# Patient Record
Sex: FEMALE | Race: OTHER | Employment: STUDENT | URBAN - METROPOLITAN AREA
[De-identification: names, ages, dates, MRNs, and addresses within clinical notes are randomized per-mention and may not be internally consistent; named-entity substitution may affect disease eponyms.]

---
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
## 2020-02-03 ENCOUNTER — OFFICE VISIT (OUTPATIENT)
Dept: FAMILY MEDICINE CLINIC | Facility: CLINIC | Age: 20
End: 2020-02-03
Payer: COMMERCIAL

## 2020-02-03 VITALS
RESPIRATION RATE: 20 BRPM | HEART RATE: 98 BPM | TEMPERATURE: 98 F | OXYGEN SATURATION: 98 % | HEIGHT: 61.5 IN | WEIGHT: 212 LBS | BODY MASS INDEX: 39.51 KG/M2 | SYSTOLIC BLOOD PRESSURE: 128 MMHG | DIASTOLIC BLOOD PRESSURE: 84 MMHG

## 2020-02-03 DIAGNOSIS — J02.9 SORE THROAT: ICD-10-CM

## 2020-02-03 DIAGNOSIS — J11.1 INFLUENZA-LIKE ILLNESS: Primary | ICD-10-CM

## 2020-02-03 DIAGNOSIS — H60.332 ACUTE SWIMMER'S EAR OF LEFT SIDE: ICD-10-CM

## 2020-02-03 LAB
CONTROL LINE PRESENT WITH A CLEAR BACKGROUND (YES/NO): YES YES/NO
KIT LOT #: NORMAL NUMERIC
STREP GRP A CUL-SCR: NEGATIVE

## 2020-02-03 PROCEDURE — 99213 OFFICE O/P EST LOW 20 MIN: CPT | Performed by: NURSE PRACTITIONER

## 2020-02-03 PROCEDURE — 87880 STREP A ASSAY W/OPTIC: CPT | Performed by: NURSE PRACTITIONER

## 2020-02-03 RX ORDER — OFLOXACIN 3 MG/ML
10 SOLUTION AURICULAR (OTIC) DAILY
Qty: 1 BOTTLE | Refills: 0 | Status: SHIPPED | OUTPATIENT
Start: 2020-02-03 | End: 2020-02-10

## 2020-02-03 NOTE — PROGRESS NOTES
Patient presents with:  Nasal Congestion: s/s for 2 days. OTC meds taken  Ear Pain: left ear  :    HPI:   Mehul Cox is a 23year old female who presents for upper respiratory symptoms for 2.5 days. Started suddenly.   Symptoms have mildly improved sin HEENT: congested; see HPI  CHEST: no chest pains, palpitations. LUNGS: denies shortness of breath or wheezing. CARDIOVASCULAR: denies chest pain. GI: no abdominal pain; see HPI  URO: no decreased urination.       EXAM:   /84   Pulse 98   Temp 97.8 PLAN:      1. Influenza-like illness  Natural course of influenza, possible complications, CDC recommendations, and OTC comfort measures discussed. Sx onset > 48 hrs.     Complications of influenza discussed including secondary infections like AOM, bronchit Influenza is also called the flu. It is a viral illness that affects the air passages of your lungs. It is different from the common cold. The flu can easily be passed from one to person to another.  It may be spread through the air by coughing and sneezing If you are age 72 or older, talk with your provider about getting a pneumococcal vaccine every 5 years. You should also get this vaccine if you have chronic asthma or COPD. All adults should get a flu vaccine every fall. Ask your provider about this.   When

## 2020-02-03 NOTE — PATIENT INSTRUCTIONS
Otitis Externa   · Don't use Q-tips. Keep ear dry. Wear cotton ball in ear during shower. No swimming or head submersion for 7 days and infection cleared. · Use antibiotic drops x 7 days. You should feel better in 2 days.  Use drops x 7 days or infection drinks without caffeine, juices, tea, and soup. Extra fluids will also help loosen secretions in your nose and lungs. · Over-the-counter cold medicines will not make the flu go away faster.  But the medicines may help with coughing, sore throat, and conges

## 2020-02-08 ENCOUNTER — OFFICE VISIT (OUTPATIENT)
Dept: FAMILY MEDICINE CLINIC | Facility: CLINIC | Age: 20
End: 2020-02-08
Payer: COMMERCIAL

## 2020-02-08 VITALS
SYSTOLIC BLOOD PRESSURE: 130 MMHG | BODY MASS INDEX: 39.51 KG/M2 | HEART RATE: 77 BPM | WEIGHT: 212 LBS | DIASTOLIC BLOOD PRESSURE: 80 MMHG | OXYGEN SATURATION: 99 % | RESPIRATION RATE: 20 BRPM | TEMPERATURE: 98 F | HEIGHT: 61.5 IN

## 2020-02-08 DIAGNOSIS — H60.332 ACUTE SWIMMER'S EAR OF LEFT SIDE: ICD-10-CM

## 2020-02-08 DIAGNOSIS — J11.1 INFLUENZA-LIKE ILLNESS: Primary | ICD-10-CM

## 2020-02-08 PROCEDURE — 99213 OFFICE O/P EST LOW 20 MIN: CPT | Performed by: NURSE PRACTITIONER

## 2020-02-08 NOTE — PATIENT INSTRUCTIONS
the ear drops prescribed at your last visit and use as directed. Follow up for any new or worsening symptoms. Influenza (Adult)    Influenza is also called the flu. It is a viral illness that affects the air passages of your lungs.  It is diff Follow up with your healthcare provider, or as advised, if you are not getting better over the next week. If you are age 72 or older, talk with your provider about getting a pneumococcal vaccine every 5 years.  You should also get this vaccine if you have · Use prescribed ear drops as directed. These help reduce swelling and fight the infection. If an ear wick was placed in the ear canal, apply drops right onto the end of the wick.  The wick will draw the medicine into the ear canal even if it is swollen prema

## 2020-02-08 NOTE — PROGRESS NOTES
CHIEF COMPLAINT:   Patient presents with:  Cough: wet, s/s for 3-4 days  Chest Congestion: OTC meds taken      HPI:   Dany Barboza is a 23year old female who presents for upper respiratory symptoms for  1 weeks.  Patient reports was seen 5 days ago for i /80   Pulse 77   Temp 97.5 °F (36.4 °C) (Oral)   Resp 20   Ht 61.5\"   Wt 212 lb (96.2 kg)   LMP 01/31/2020 (Exact Date)   SpO2 99%   Breastfeeding No   BMI 39.41 kg/m²   GENERAL: well developed, well nourished,in no apparent distress  SKIN: no rashe Patient Instructions      the ear drops prescribed at your last visit and use as directed. Follow up for any new or worsening symptoms. Influenza (Adult)    Influenza is also called the flu.  It is a viral illness that affects the air passages of Follow-up care  Follow up with your healthcare provider, or as advised, if you are not getting better over the next week. If you are age 72 or older, talk with your provider about getting a pneumococcal vaccine every 5 years.  You should also get this vacc · Use prescribed ear drops as directed. These help reduce swelling and fight the infection. If an ear wick was placed in the ear canal, apply drops right onto the end of the wick.  The wick will draw the medicine into the ear canal even if it is swollen prema The patient is asked to return if sx's persist or worsen.

## 2020-02-29 ENCOUNTER — LAB ENCOUNTER (OUTPATIENT)
Dept: LAB | Age: 20
End: 2020-02-29
Attending: PEDIATRICS
Payer: COMMERCIAL

## 2020-02-29 DIAGNOSIS — R63.5 ABNORMAL WEIGHT GAIN: Primary | ICD-10-CM

## 2020-02-29 LAB
ALT SERPL-CCNC: 64 U/L (ref 13–56)
AST SERPL-CCNC: 35 U/L (ref 15–37)
CHOLEST SMN-MCNC: 152 MG/DL (ref ?–200)
EST. AVERAGE GLUCOSE BLD GHB EST-MCNC: 117 MG/DL (ref 68–126)
GLUCOSE BLD-MCNC: 86 MG/DL (ref 70–99)
HBA1C MFR BLD HPLC: 5.7 % (ref ?–5.7)
HDLC SERPL-MCNC: 49 MG/DL (ref 40–59)
LDLC SERPL CALC-MCNC: 76 MG/DL (ref ?–100)
NONHDLC SERPL-MCNC: 103 MG/DL (ref ?–130)
PATIENT FASTING Y/N/NP: YES
PATIENT FASTING Y/N/NP: YES
TRIGL SERPL-MCNC: 135 MG/DL (ref 30–149)
VIT D+METAB SERPL-MCNC: 13.2 NG/ML (ref 30–100)
VLDLC SERPL CALC-MCNC: 27 MG/DL (ref 0–30)

## 2020-02-29 PROCEDURE — 80061 LIPID PANEL: CPT

## 2020-02-29 PROCEDURE — 84450 TRANSFERASE (AST) (SGOT): CPT

## 2020-02-29 PROCEDURE — 36415 COLL VENOUS BLD VENIPUNCTURE: CPT

## 2020-02-29 PROCEDURE — 84460 ALANINE AMINO (ALT) (SGPT): CPT

## 2020-02-29 PROCEDURE — 82947 ASSAY GLUCOSE BLOOD QUANT: CPT

## 2020-02-29 PROCEDURE — 83036 HEMOGLOBIN GLYCOSYLATED A1C: CPT

## 2020-02-29 PROCEDURE — 82306 VITAMIN D 25 HYDROXY: CPT

## 2020-08-21 ENCOUNTER — HOSPITAL ENCOUNTER (EMERGENCY)
Age: 20
Discharge: HOME | End: 2020-08-21
Attending: EMERGENCY MEDICINE
Payer: COMMERCIAL

## 2020-08-21 VITALS
WEIGHT: 212 LBS | SYSTOLIC BLOOD PRESSURE: 144 MMHG | OXYGEN SATURATION: 97 % | BODY MASS INDEX: 39.01 KG/M2 | HEART RATE: 92 BPM | HEIGHT: 62 IN | DIASTOLIC BLOOD PRESSURE: 88 MMHG | TEMPERATURE: 98 F | RESPIRATION RATE: 18 BRPM

## 2020-08-21 DIAGNOSIS — T78.40XA ALLERGIC REACTION, INITIAL ENCOUNTER: Primary | ICD-10-CM

## 2020-08-21 PROCEDURE — 99283 EMERGENCY DEPT VISIT LOW MDM: CPT

## 2020-08-21 RX ORDER — PREDNISONE 20 MG/1
40 TABLET ORAL ONCE
Status: COMPLETED | OUTPATIENT
Start: 2020-08-21 | End: 2020-08-21

## 2020-08-21 RX ORDER — DIPHENHYDRAMINE HCL 50 MG
50 CAPSULE ORAL ONCE
Status: COMPLETED | OUTPATIENT
Start: 2020-08-21 | End: 2020-08-21

## 2020-08-21 RX ORDER — PREDNISONE 20 MG/1
40 TABLET ORAL DAILY
Qty: 10 TABLET | Refills: 0 | Status: SHIPPED | OUTPATIENT
Start: 2020-08-21 | End: 2020-08-26

## 2020-08-21 NOTE — ED PROVIDER NOTES
Patient Seen in: THE [facility] Emergency Department In [place]      History   Patient presents with:   Allergic Rxn Allergies    Stated Complaint: hives    HPI    80-year-old female comes to the hospital complaint of having a raised itchy rash across her back consistent with a contact dermatitis with allergic component. Patient was given Benadryl and prednisone here will be discharged home for the outpatient management.         MDM     Medications   predniSONE (DELTASONE) tab 40 mg (has no administration in paramjit

## 2021-02-11 ENCOUNTER — OFFICE VISIT (OUTPATIENT)
Dept: INTERNAL MEDICINE CLINIC | Facility: CLINIC | Age: 21
End: 2021-02-11
Payer: COMMERCIAL

## 2021-02-11 VITALS
SYSTOLIC BLOOD PRESSURE: 120 MMHG | HEIGHT: 62 IN | RESPIRATION RATE: 14 BRPM | WEIGHT: 226 LBS | HEART RATE: 84 BPM | DIASTOLIC BLOOD PRESSURE: 76 MMHG | BODY MASS INDEX: 41.59 KG/M2

## 2021-02-11 DIAGNOSIS — R73.03 PREDIABETES: ICD-10-CM

## 2021-02-11 DIAGNOSIS — Z51.81 ENCOUNTER FOR THERAPEUTIC DRUG MONITORING: Primary | ICD-10-CM

## 2021-02-11 DIAGNOSIS — K76.0 FATTY LIVER: ICD-10-CM

## 2021-02-11 DIAGNOSIS — F41.9 ANXIETY AND DEPRESSION: ICD-10-CM

## 2021-02-11 DIAGNOSIS — E66.01 MORBID OBESITY WITH BMI OF 40.0-44.9, ADULT (HCC): ICD-10-CM

## 2021-02-11 DIAGNOSIS — F32.A ANXIETY AND DEPRESSION: ICD-10-CM

## 2021-02-11 DIAGNOSIS — E55.9 VITAMIN D DEFICIENCY: ICD-10-CM

## 2021-02-11 DIAGNOSIS — Z82.49 FAMILY HISTORY OF HEART DISEASE: ICD-10-CM

## 2021-02-11 DIAGNOSIS — R94.31 NONSPECIFIC ABNORMAL ELECTROCARDIOGRAM (ECG) (EKG): ICD-10-CM

## 2021-02-11 PROCEDURE — 3078F DIAST BP <80 MM HG: CPT | Performed by: NURSE PRACTITIONER

## 2021-02-11 PROCEDURE — 3074F SYST BP LT 130 MM HG: CPT | Performed by: NURSE PRACTITIONER

## 2021-02-11 PROCEDURE — 93000 ELECTROCARDIOGRAM COMPLETE: CPT | Performed by: NURSE PRACTITIONER

## 2021-02-11 PROCEDURE — 99214 OFFICE O/P EST MOD 30 MIN: CPT | Performed by: NURSE PRACTITIONER

## 2021-02-11 PROCEDURE — 3008F BODY MASS INDEX DOCD: CPT | Performed by: NURSE PRACTITIONER

## 2021-02-11 RX ORDER — SERTRALINE HYDROCHLORIDE 25 MG/1
25 TABLET, FILM COATED ORAL NIGHTLY
COMMUNITY
Start: 2021-01-03

## 2021-02-11 RX ORDER — METFORMIN HYDROCHLORIDE 750 MG/1
TABLET, EXTENDED RELEASE ORAL
Qty: 60 TABLET | Refills: 1 | Status: SHIPPED | OUTPATIENT
Start: 2021-02-11 | End: 2021-04-08

## 2021-02-11 NOTE — PATIENT INSTRUCTIONS
Welcome to the [facility]...your Lifestyle Renovation begins now! Thank you for placing your trust in our health care team, I look forward to working with you along this journey to better health!     Next steps:     1.  Sched daily dietary intake and you will be able to see if you are eating the right amount of calories or too much or too little which would hinder weight loss. Additionally this will help to see your daily carbohydrate intake.  When you set the valerio up chose 1-2 l

## 2021-02-11 NOTE — PROGRESS NOTES
HISTORY OF PRESENT ILLNESS  Patient presents with:  Weight Problem: referred  by mom who found out through her Dr Nabil Fitzgerald is a 21year old female new to our office today for initiation of medical weight loss program.  Patient presents today wit negative  Migraines: negative  Seizures: negative  Joint-related conditions: negative  Liver disease: elevated LFTs  Renal disease: negative  Diabetes: prediabetes  Thyroid disease: negative  Constipation: negative  Other pertinent hx: PCOS?   Sleep Apnea h three  PSYCH: pleasant, cooperative, normal mood and affect    Lab Results   Component Value Date    WBC 6.8 05/05/2018    RBC 4.19 05/05/2018    HGB 12.4 05/05/2018    HCT 38.7 05/05/2018    MCV 92.4 05/05/2018    MCH 29.6 05/05/2018    MCHC 32.0 05/05/20 COMPLETE  -     VITAMIN D, 25-HYDROXY; Future  -     VITAMIN B12; Future  -     CBC WITH DIFFERENTIAL WITH PLATELET; Future  -     COMP METABOLIC PANEL (14); Future  -     TSH+FREE T4; Future  -     HEMOGLOBIN A1C; Future  -     LIPID PANEL;  Future  - (CPT=93306); Future    Anxiety and depression  - continue with counseling  -     VITAMIN D, 25-HYDROXY; Future  -     VITAMIN B12; Future  -     CBC WITH DIFFERENTIAL WITH PLATELET; Future  -     COMP METABOLIC PANEL (14);  Future  -     OP REFERRAL TO DIET as ordered: cardiac 2D echo    Please try to work on the following dietary changes this first month:    1. Drink water with meals and throughout the day, cut down on soda and/or juice if consumed.  Consider flavored water options like Bubbly, Spindrift, Hi difficult. Exercising is one way to help with stress, but meditation using the CALM Blue or another comparable alternative can be done in your home or place of work with little time commitment.  This Blue can also help work on behavior change and improve sle

## 2021-02-17 ENCOUNTER — TELEPHONE (OUTPATIENT)
Dept: INTERNAL MEDICINE CLINIC | Facility: CLINIC | Age: 21
End: 2021-02-17

## 2021-03-06 ENCOUNTER — LAB ENCOUNTER (OUTPATIENT)
Dept: LAB | Age: 21
End: 2021-03-06
Attending: NURSE PRACTITIONER
Payer: COMMERCIAL

## 2021-03-06 DIAGNOSIS — E66.01 MORBID OBESITY WITH BMI OF 40.0-44.9, ADULT (HCC): ICD-10-CM

## 2021-03-06 DIAGNOSIS — F32.A ANXIETY AND DEPRESSION: ICD-10-CM

## 2021-03-06 DIAGNOSIS — Z51.81 ENCOUNTER FOR THERAPEUTIC DRUG MONITORING: ICD-10-CM

## 2021-03-06 DIAGNOSIS — K76.0 FATTY LIVER: ICD-10-CM

## 2021-03-06 DIAGNOSIS — E55.9 VITAMIN D DEFICIENCY: ICD-10-CM

## 2021-03-06 DIAGNOSIS — Z82.49 FAMILY HISTORY OF HEART DISEASE: ICD-10-CM

## 2021-03-06 DIAGNOSIS — R73.03 PREDIABETES: ICD-10-CM

## 2021-03-06 DIAGNOSIS — F41.9 ANXIETY AND DEPRESSION: ICD-10-CM

## 2021-03-06 LAB
ALBUMIN SERPL-MCNC: 4.3 G/DL (ref 3.4–5)
ALBUMIN/GLOB SERPL: 1.2 {RATIO} (ref 1–2)
ALP LIVER SERPL-CCNC: 58 U/L
ALT SERPL-CCNC: 79 U/L
ANION GAP SERPL CALC-SCNC: 6 MMOL/L (ref 0–18)
AST SERPL-CCNC: 35 U/L (ref 15–37)
BASOPHILS # BLD AUTO: 0.05 X10(3) UL (ref 0–0.2)
BASOPHILS NFR BLD AUTO: 0.7 %
BILIRUB SERPL-MCNC: 0.4 MG/DL (ref 0.1–2)
BUN BLD-MCNC: 17 MG/DL (ref 7–18)
BUN/CREAT SERPL: 23.6 (ref 10–20)
CALCIUM BLD-MCNC: 9.3 MG/DL (ref 8.5–10.1)
CHLORIDE SERPL-SCNC: 105 MMOL/L (ref 98–112)
CHOLEST SMN-MCNC: 152 MG/DL (ref ?–200)
CO2 SERPL-SCNC: 25 MMOL/L (ref 21–32)
CREAT BLD-MCNC: 0.72 MG/DL
DEPRECATED RDW RBC AUTO: 40.4 FL (ref 35.1–46.3)
EOSINOPHIL # BLD AUTO: 0.23 X10(3) UL (ref 0–0.7)
EOSINOPHIL NFR BLD AUTO: 3.2 %
ERYTHROCYTE [DISTWIDTH] IN BLOOD BY AUTOMATED COUNT: 12 % (ref 11–15)
EST. AVERAGE GLUCOSE BLD GHB EST-MCNC: 120 MG/DL (ref 68–126)
GLOBULIN PLAS-MCNC: 3.7 G/DL (ref 2.8–4.4)
GLUCOSE BLD-MCNC: 89 MG/DL (ref 70–99)
HBA1C MFR BLD HPLC: 5.8 % (ref ?–5.7)
HCT VFR BLD AUTO: 41 %
HDLC SERPL-MCNC: 65 MG/DL (ref 40–59)
HGB BLD-MCNC: 13.6 G/DL
IMM GRANULOCYTES # BLD AUTO: 0.02 X10(3) UL (ref 0–1)
IMM GRANULOCYTES NFR BLD: 0.3 %
LDLC SERPL CALC-MCNC: 69 MG/DL (ref ?–100)
LYMPHOCYTES # BLD AUTO: 1.67 X10(3) UL (ref 1–4)
LYMPHOCYTES NFR BLD AUTO: 23.2 %
M PROTEIN MFR SERPL ELPH: 8 G/DL (ref 6.4–8.2)
MCH RBC QN AUTO: 30.6 PG (ref 26–34)
MCHC RBC AUTO-ENTMCNC: 33.2 G/DL (ref 31–37)
MCV RBC AUTO: 92.3 FL
MONOCYTES # BLD AUTO: 0.31 X10(3) UL (ref 0.1–1)
MONOCYTES NFR BLD AUTO: 4.3 %
NEUTROPHILS # BLD AUTO: 4.91 X10 (3) UL (ref 1.5–7.7)
NEUTROPHILS # BLD AUTO: 4.91 X10(3) UL (ref 1.5–7.7)
NEUTROPHILS NFR BLD AUTO: 68.3 %
NONHDLC SERPL-MCNC: 87 MG/DL (ref ?–130)
OSMOLALITY SERPL CALC.SUM OF ELEC: 283 MOSM/KG (ref 275–295)
PATIENT FASTING Y/N/NP: YES
PATIENT FASTING Y/N/NP: YES
PLATELET # BLD AUTO: 327 10(3)UL (ref 150–450)
POTASSIUM SERPL-SCNC: 4.1 MMOL/L (ref 3.5–5.1)
RBC # BLD AUTO: 4.44 X10(6)UL
SODIUM SERPL-SCNC: 136 MMOL/L (ref 136–145)
T4 FREE SERPL-MCNC: 0.9 NG/DL (ref 0.8–1.7)
TRIGL SERPL-MCNC: 89 MG/DL (ref 30–149)
TSI SER-ACNC: 2 MIU/ML (ref 0.36–3.74)
VIT B12 SERPL-MCNC: 336 PG/ML (ref 193–986)
VIT D+METAB SERPL-MCNC: 17.3 NG/ML (ref 30–100)
VLDLC SERPL CALC-MCNC: 18 MG/DL (ref 0–30)
WBC # BLD AUTO: 7.2 X10(3) UL (ref 4–11)

## 2021-03-06 PROCEDURE — 85025 COMPLETE CBC W/AUTO DIFF WBC: CPT

## 2021-03-06 PROCEDURE — 82607 VITAMIN B-12: CPT

## 2021-03-06 PROCEDURE — 80053 COMPREHEN METABOLIC PANEL: CPT

## 2021-03-06 PROCEDURE — 84439 ASSAY OF FREE THYROXINE: CPT

## 2021-03-06 PROCEDURE — 80061 LIPID PANEL: CPT

## 2021-03-06 PROCEDURE — 36415 COLL VENOUS BLD VENIPUNCTURE: CPT

## 2021-03-06 PROCEDURE — 84443 ASSAY THYROID STIM HORMONE: CPT

## 2021-03-06 PROCEDURE — 82306 VITAMIN D 25 HYDROXY: CPT

## 2021-03-06 PROCEDURE — 83036 HEMOGLOBIN GLYCOSYLATED A1C: CPT

## 2021-03-06 NOTE — PROGRESS NOTES
Juve Howell is a 21year old female presents today for 1 month follow-up on medical weight loss program for the treatment of overweight, obesity, or morbid obesity with associated prediabes, fatty liver, Vitamin D deficiency, sub-optimal Vitamin B12. depressed    EXAM:  /90   Pulse 88   Resp 14   Ht 5' 2\" (1.575 m)   Wt 224 lb (101.6 kg)   LMP 02/22/2021   BMI 40.97 kg/m²   GENERAL: well developed, well nourished, in no apparent distress, morbidly obese  EYES: conjunctiva pink, sclera non icteri Continue making lifestyle changes that focus on good nutrition, regular exercise and stress management. Medication Plan: Continue current medication regimen. Add Topamax at 1 tab daily for 7 days, then increase to 1 tab twice a day as prescribed.  Start fat and salt. It's healthier to season plain frozen vegetables yourself. Try fresh herbs, garlic, toasted almonds, or sesame seeds. · Canned vegetables often have lots of salt. Shop for low-sodium varieties.   One small change  Sneak vegetables into every make a person more likely to have it.  These include:  · A family history of type 2 diabetes  · Being overweight  · Being over age 39  · Have hypertension or elevated cholesterol   · Having had gestational diabetes  · Not being physically active  · Being Af the treatment plan you have been given. You may have your blood glucose tested again in about 12 to 18 months.   Symptoms of diabetes  Let your healthcare provider know if you have any of the following:  · Always feel very tired  · Feel very thirsty or hung

## 2021-03-07 RX ORDER — METFORMIN HYDROCHLORIDE 750 MG/1
TABLET, EXTENDED RELEASE ORAL
Qty: 60 TABLET | Refills: 1 | Status: CANCELLED | OUTPATIENT
Start: 2021-03-07

## 2021-03-08 ENCOUNTER — OFFICE VISIT (OUTPATIENT)
Dept: INTERNAL MEDICINE CLINIC | Facility: CLINIC | Age: 21
End: 2021-03-08
Payer: COMMERCIAL

## 2021-03-08 VITALS
WEIGHT: 224 LBS | HEART RATE: 88 BPM | RESPIRATION RATE: 14 BRPM | SYSTOLIC BLOOD PRESSURE: 130 MMHG | BODY MASS INDEX: 41.22 KG/M2 | HEIGHT: 62 IN | DIASTOLIC BLOOD PRESSURE: 90 MMHG

## 2021-03-08 DIAGNOSIS — K76.0 FATTY LIVER: ICD-10-CM

## 2021-03-08 DIAGNOSIS — E55.9 VITAMIN D DEFICIENCY: ICD-10-CM

## 2021-03-08 DIAGNOSIS — Z51.81 ENCOUNTER FOR THERAPEUTIC DRUG MONITORING: Primary | ICD-10-CM

## 2021-03-08 DIAGNOSIS — E66.01 MORBID OBESITY WITH BMI OF 40.0-44.9, ADULT (HCC): ICD-10-CM

## 2021-03-08 DIAGNOSIS — R73.03 PREDIABETES: ICD-10-CM

## 2021-03-08 DIAGNOSIS — E53.8 LOW VITAMIN B12 LEVEL: ICD-10-CM

## 2021-03-08 PROCEDURE — 99214 OFFICE O/P EST MOD 30 MIN: CPT | Performed by: NURSE PRACTITIONER

## 2021-03-08 PROCEDURE — 3075F SYST BP GE 130 - 139MM HG: CPT | Performed by: NURSE PRACTITIONER

## 2021-03-08 PROCEDURE — 3080F DIAST BP >= 90 MM HG: CPT | Performed by: NURSE PRACTITIONER

## 2021-03-08 PROCEDURE — 3008F BODY MASS INDEX DOCD: CPT | Performed by: NURSE PRACTITIONER

## 2021-03-08 RX ORDER — ERGOCALCIFEROL 1.25 MG/1
50000 CAPSULE ORAL WEEKLY
Qty: 12 CAPSULE | Refills: 1 | Status: SHIPPED | OUTPATIENT
Start: 2021-03-08 | End: 2021-09-30

## 2021-03-08 RX ORDER — TOPIRAMATE 25 MG/1
25 TABLET ORAL 2 TIMES DAILY
Qty: 60 TABLET | Refills: 1 | Status: SHIPPED | OUTPATIENT
Start: 2021-03-08 | End: 2021-05-27

## 2021-03-08 NOTE — PATIENT INSTRUCTIONS
Continue making lifestyle changes that focus on good nutrition, regular exercise and stress management. Medication Plan: Continue current medication regimen. Add Topamax at 1 tab daily for 7 days, then increase to 1 tab twice a day as prescribed.  Start fat and salt. It's healthier to season plain frozen vegetables yourself. Try fresh herbs, garlic, toasted almonds, or sesame seeds. · Canned vegetables often have lots of salt. Shop for low-sodium varieties.   One small change  Sneak vegetables into every make a person more likely to have it.  These include:  · A family history of type 2 diabetes  · Being overweight  · Being over age 39  · Have hypertension or elevated cholesterol   · Having had gestational diabetes  · Not being physically active  · Being Af the treatment plan you have been given. You may have your blood glucose tested again in about 12 to 18 months.   Symptoms of diabetes  Let your healthcare provider know if you have any of the following:  · Always feel very tired  · Feel very thirsty or hung

## 2021-03-18 ENCOUNTER — HOSPITAL ENCOUNTER (OUTPATIENT)
Dept: CV DIAGNOSTICS | Age: 21
Discharge: HOME | End: 2021-03-18
Attending: NURSE PRACTITIONER
Payer: COMMERCIAL

## 2021-03-18 DIAGNOSIS — E66.01 MORBID OBESITY WITH BMI OF 40.0-44.9, ADULT (HCC): ICD-10-CM

## 2021-03-18 DIAGNOSIS — Z82.49 FAMILY HISTORY OF HEART DISEASE: ICD-10-CM

## 2021-03-18 DIAGNOSIS — R94.31 NONSPECIFIC ABNORMAL ELECTROCARDIOGRAM (ECG) (EKG): ICD-10-CM

## 2021-03-18 DIAGNOSIS — R73.03 PREDIABETES: ICD-10-CM

## 2021-03-18 PROCEDURE — 93306 TTE W/DOPPLER COMPLETE: CPT | Performed by: NURSE PRACTITIONER

## 2021-03-27 ENCOUNTER — OFFICE VISIT (OUTPATIENT)
Dept: INTERNAL MEDICINE CLINIC | Facility: CLINIC | Age: 21
End: 2021-03-27
Payer: COMMERCIAL

## 2021-03-27 VITALS
TEMPERATURE: 98 F | HEART RATE: 80 BPM | DIASTOLIC BLOOD PRESSURE: 82 MMHG | OXYGEN SATURATION: 98 % | WEIGHT: 219 LBS | SYSTOLIC BLOOD PRESSURE: 124 MMHG | BODY MASS INDEX: 39.79 KG/M2 | RESPIRATION RATE: 16 BRPM | HEIGHT: 62.25 IN

## 2021-03-27 DIAGNOSIS — R74.8 ELEVATED LIVER ENZYMES: ICD-10-CM

## 2021-03-27 DIAGNOSIS — E55.9 HYPOVITAMINOSIS D: ICD-10-CM

## 2021-03-27 DIAGNOSIS — R73.03 PRE-DIABETES: ICD-10-CM

## 2021-03-27 DIAGNOSIS — E66.01 MORBID OBESITY WITH BMI OF 40.0-44.9, ADULT (HCC): ICD-10-CM

## 2021-03-27 DIAGNOSIS — Z00.00 ENCOUNTER FOR ROUTINE ADULT MEDICAL EXAMINATION: Primary | ICD-10-CM

## 2021-03-27 DIAGNOSIS — N92.6 IRREGULAR PERIODS: ICD-10-CM

## 2021-03-27 PROCEDURE — 3074F SYST BP LT 130 MM HG: CPT | Performed by: INTERNAL MEDICINE

## 2021-03-27 PROCEDURE — 99385 PREV VISIT NEW AGE 18-39: CPT | Performed by: INTERNAL MEDICINE

## 2021-03-27 PROCEDURE — 3079F DIAST BP 80-89 MM HG: CPT | Performed by: INTERNAL MEDICINE

## 2021-03-27 PROCEDURE — 3008F BODY MASS INDEX DOCD: CPT | Performed by: INTERNAL MEDICINE

## 2021-03-27 NOTE — PATIENT INSTRUCTIONS
June 7, 2021 or later for blood work (no fasting needed) after you complete 12 weeks of vitamin D prescription

## 2021-03-27 NOTE — PROGRESS NOTES
[facility] Internal Medicine Office Note  Chief Complaint:   Patient presents with:  Physical      HPI:   This is a 21year old female coming in for establishing care and physical.  HPI  She had issues with loose stools and occasional vomiting wh Dispense Refill   • ergocalciferol 1.25 MG (16417 UT) Oral Cap Take 1 capsule (50,000 Units total) by mouth once a week. 12 capsule 1   • topiramate 25 MG Oral Tab Take 1 tablet (25 mg total) by mouth 2 (two) times daily.  Refer to discharge instructions fo heart sounds. Pulmonary:      Effort: Pulmonary effort is normal.      Breath sounds: Normal breath sounds. Musculoskeletal:      Cervical back: Neck supple. Skin:     General: Skin is warm and dry.    Neurological:      General: No focal deficit pres [E]      Ferritin [E]      Meds & Refills for this Visit:  Requested Prescriptions      No prescriptions requested or ordered in this encounter       Imaging & Consults:  OBG - INTERNAL    MMR Vaccines(1 of 1 - Standard series) Never done  Varicella Vaccin

## 2021-04-08 ENCOUNTER — OFFICE VISIT (OUTPATIENT)
Dept: INTERNAL MEDICINE CLINIC | Facility: CLINIC | Age: 21
End: 2021-04-08
Payer: COMMERCIAL

## 2021-04-08 VITALS
BODY MASS INDEX: 39.93 KG/M2 | RESPIRATION RATE: 14 BRPM | SYSTOLIC BLOOD PRESSURE: 110 MMHG | HEART RATE: 68 BPM | DIASTOLIC BLOOD PRESSURE: 68 MMHG | HEIGHT: 62 IN | WEIGHT: 217 LBS

## 2021-04-08 DIAGNOSIS — E66.01 MORBID OBESITY WITH BMI OF 40.0-44.9, ADULT (HCC): ICD-10-CM

## 2021-04-08 DIAGNOSIS — Z51.81 ENCOUNTER FOR THERAPEUTIC DRUG MONITORING: Primary | ICD-10-CM

## 2021-04-08 DIAGNOSIS — K76.0 FATTY LIVER: ICD-10-CM

## 2021-04-08 DIAGNOSIS — R73.03 PREDIABETES: ICD-10-CM

## 2021-04-08 PROCEDURE — 3008F BODY MASS INDEX DOCD: CPT | Performed by: NURSE PRACTITIONER

## 2021-04-08 PROCEDURE — 99213 OFFICE O/P EST LOW 20 MIN: CPT | Performed by: NURSE PRACTITIONER

## 2021-04-08 PROCEDURE — 3074F SYST BP LT 130 MM HG: CPT | Performed by: NURSE PRACTITIONER

## 2021-04-08 PROCEDURE — 3078F DIAST BP <80 MM HG: CPT | Performed by: NURSE PRACTITIONER

## 2021-04-08 RX ORDER — METFORMIN HYDROCHLORIDE 750 MG/1
1500 TABLET, EXTENDED RELEASE ORAL
Qty: 180 TABLET | Refills: 1 | Status: SHIPPED | OUTPATIENT
Start: 2021-04-08

## 2021-04-08 NOTE — PROGRESS NOTES
Leda Lesch is a 21year old female presents today for 2 month follow-up on medical weight loss program for the treatment of overweight, obesity, or morbid obesity with associated prediabes, fatty liver, Vitamin D deficiency, sub-optimal Vitamin B12. 110/68   Pulse 68   Resp 14   Ht 5' 2\" (1.575 m)   Wt 217 lb (98.4 kg)   LMP 03/25/2021   BMI 39.69 kg/m²   GENERAL: well developed, well nourished, in no apparent distress, obese  EYES: conjunctiva pink, sclera non icteric, PERRLA  LUNGS: CTA in all fiel making lifestyle changes! Eat the rainbow daily and record to be a tool to help remind you to eat fruit and veggies! Tips below for eating on the go as well.              Ilir Sena/[employer] Purple   Monday Tuesday Wednesday        T steamed and served with no butter or sauce. Ask for lemon juice or vinegar to sprinkle on them for flavor. Keep these tips in mind  · Choose minestrone or vegetable soups. Ask about sodium content. · Order salad dressing on the side.  Dip your fork in the vegetable soup or a salad. This may help to prevent you from overeating during the meal.  · Order meat, poultry, and fish broiled, grilled, baked, poached, or steamed. Always remove the skin from chicken.   · Choose [address] [place] [address] dishes made with soft, rather thu

## 2021-04-08 NOTE — PATIENT INSTRUCTIONS
Continue making lifestyle changes that focus on good nutrition, regular exercise and stress management. Medication Plan: Continue current medication regimen.     Next steps to work on before next office visit include: Great work on making lifestyle maier salt.  · Ask that sauces be left off or served on the side. Choose sauces made with tomato instead of with cream or cheese.   · Ask for steamed rice or a baked or boiled potato, without butter or sour cream.  · Ask that vegetables be steamed and served with they can be done without high-fat ingredients, such as cream, butter, cheese, and oil. · Ask for sauces and salad dressings on the side and use just a tablespoon. Ask for low-fat dressings.   · Try starting your meal with a bowl of vegetable soup or a blade

## 2021-04-10 ENCOUNTER — IMMUNIZATION (OUTPATIENT)
Dept: LAB | Age: 21
End: 2021-04-10
Attending: HOSPITALIST
Payer: COMMERCIAL

## 2021-04-10 DIAGNOSIS — Z23 NEED FOR VACCINATION: Primary | ICD-10-CM

## 2021-04-10 PROCEDURE — 0001A SARSCOV2 VAC 30MCG/0.3ML IM: CPT

## 2021-05-01 ENCOUNTER — HOSPITAL ENCOUNTER (OUTPATIENT)
Age: 21
Discharge: HOME | End: 2021-05-01
Attending: EMERGENCY MEDICINE
Payer: COMMERCIAL

## 2021-05-01 VITALS
HEART RATE: 114 BPM | DIASTOLIC BLOOD PRESSURE: 80 MMHG | WEIGHT: 219 LBS | BODY MASS INDEX: 40.3 KG/M2 | HEIGHT: 62 IN | TEMPERATURE: 97 F | OXYGEN SATURATION: 96 % | SYSTOLIC BLOOD PRESSURE: 103 MMHG | RESPIRATION RATE: 20 BRPM

## 2021-05-01 DIAGNOSIS — B34.9 VIRAL SYNDROME: Primary | ICD-10-CM

## 2021-05-01 PROCEDURE — 99213 OFFICE O/P EST LOW 20 MIN: CPT

## 2021-05-01 PROCEDURE — 99212 OFFICE O/P EST SF 10 MIN: CPT

## 2021-05-01 NOTE — ED INITIAL ASSESSMENT (HPI)
Pt presents with congestion, bodyaches, bilat ear pain,cough, exposed toniece who w  Has been dx with influenza

## 2021-05-01 NOTE — ED PROVIDER NOTES
Patient Seen in: [facility]      History   Patient presents with:  Cough/URI    Stated Complaint: CONGESTION/BODY ACHES/VOMITING X 2 DAYS    HPI/Subjective:   HPI    Patient presents with multiple complaints.   The patient states that yest 99.3 kg   LMP 03/25/2021   SpO2 96%   BMI 40.06 kg/m²         Physical Exam    General: Alert and oriented x3 in no acute distress.   HEENT: Normocephalic, atraumatic, pupils equal round and reactive to light, oropharynx clear, uvula midline, TMs clear bila

## 2021-05-03 ENCOUNTER — TELEPHONE (OUTPATIENT)
Dept: OBGYN CLINIC | Facility: CLINIC | Age: 21
End: 2021-05-03

## 2021-05-03 NOTE — TELEPHONE ENCOUNTER
Patient was seen in [facility] on 05/01 for body aches, vomiting, and congestion. Patient will be re-scheduled until she is symptom free. Patient verbalized understanding. Call transferred to [facility] to reschedule.

## 2021-05-06 ENCOUNTER — HOSPITAL ENCOUNTER (OUTPATIENT)
Age: 21
Discharge: HOME | End: 2021-05-06
Attending: EMERGENCY MEDICINE
Payer: COMMERCIAL

## 2021-05-06 VITALS
DIASTOLIC BLOOD PRESSURE: 73 MMHG | WEIGHT: 219 LBS | RESPIRATION RATE: 12 BRPM | HEIGHT: 62 IN | TEMPERATURE: 98 F | BODY MASS INDEX: 40.3 KG/M2 | SYSTOLIC BLOOD PRESSURE: 118 MMHG | OXYGEN SATURATION: 97 % | HEART RATE: 78 BPM

## 2021-05-06 DIAGNOSIS — J06.9 VIRAL UPPER RESPIRATORY TRACT INFECTION: Primary | ICD-10-CM

## 2021-05-06 PROCEDURE — 99213 OFFICE O/P EST LOW 20 MIN: CPT

## 2021-05-06 PROCEDURE — 99212 OFFICE O/P EST SF 10 MIN: CPT

## 2021-05-06 NOTE — ED PROVIDER NOTES
Patient Seen in: [facility] [place]      History   Patient presents with:  Cough/URI    Stated Complaint: cough    HPI/Subjective:   HPI    17-year-old female comes to the hospital complaint of having difficulty with having a runny nose, sneeze, Disposition and Plan     Clinical Impression:  Viral upper respiratory tract infection  (primary encounter diagnosis)     Disposition:  Discharge  5/6/2021 11:18 am    Follow-up:  Ash Blanco MD  [address]  [address]

## 2021-05-06 NOTE — ED INITIAL ASSESSMENT (HPI)
Patient here with with complaints of a productive cough with \"opaque mucus\" that started this past Sunday. She has also had post nasal drip. Denies any fevers, chills, ear or throat pain, or any other symptoms.  She has used Robitussin, dayquil, nyquil, a

## 2021-05-07 ENCOUNTER — IMMUNIZATION (OUTPATIENT)
Dept: LAB | Age: 21
End: 2021-05-07
Attending: HOSPITALIST
Payer: COMMERCIAL

## 2021-05-07 DIAGNOSIS — Z23 NEED FOR VACCINATION: Primary | ICD-10-CM

## 2021-05-07 PROCEDURE — 0002A SARSCOV2 VAC 30MCG/0.3ML IM: CPT

## 2021-05-25 DIAGNOSIS — E66.01 MORBID OBESITY WITH BMI OF 40.0-44.9, ADULT (HCC): ICD-10-CM

## 2021-05-25 DIAGNOSIS — Z51.81 ENCOUNTER FOR THERAPEUTIC DRUG MONITORING: ICD-10-CM

## 2021-05-26 NOTE — TELEPHONE ENCOUNTER
Requesting topiramate 25 mg  LOV: 4/8/21  RTC: one month  Last Relevant Labs: na  Filled: 3/8/21 #60 with 1 refills    Pt cancelled her 5/6/21 appt made d/t illness. Never rescheduled. My chart sent.

## 2021-05-27 RX ORDER — TOPIRAMATE 25 MG/1
25 TABLET ORAL 2 TIMES DAILY
Qty: 60 TABLET | Refills: 0 | Status: SHIPPED | OUTPATIENT
Start: 2021-05-27 | End: 2021-09-30

## 2021-05-27 NOTE — TELEPHONE ENCOUNTER
Now scheduled    Future Appointments   Date Time Provider Jose L Orellana   6/29/2021 12:00 PM Malia Mcdowell MD [facility] [address]   6/29/2021  2:00 PM MAC Lyles [facility] [address]

## 2021-05-28 RX ORDER — TOPIRAMATE 25 MG/1
25 TABLET ORAL 2 TIMES DAILY
Qty: 60 TABLET | Refills: 1 | OUTPATIENT
Start: 2021-05-28

## 2021-06-18 ENCOUNTER — PATIENT MESSAGE (OUTPATIENT)
Dept: INTERNAL MEDICINE CLINIC | Facility: CLINIC | Age: 21
End: 2021-06-18

## 2021-06-18 DIAGNOSIS — R73.03 PREDIABETES: Primary | ICD-10-CM

## 2021-06-18 NOTE — TELEPHONE ENCOUNTER
From: Lb Joshi  To: Gianna Reyes MD  Sent: 6/18/2021 4:26 AM CDT  Subject: Referral Request    Hi ! I hope you're doing well. I know at out last appointment you mentioned getting my A1C tested again after June 7th.  Do I need a referral/o

## 2021-06-26 ENCOUNTER — LAB ENCOUNTER (OUTPATIENT)
Dept: LAB | Age: 21
End: 2021-06-26
Attending: INTERNAL MEDICINE
Payer: COMMERCIAL

## 2021-06-26 DIAGNOSIS — R73.03 PRE-DIABETES: ICD-10-CM

## 2021-06-26 DIAGNOSIS — E55.9 HYPOVITAMINOSIS D: ICD-10-CM

## 2021-06-26 DIAGNOSIS — R74.8 ELEVATED LIVER ENZYMES: ICD-10-CM

## 2021-06-26 PROCEDURE — 87340 HEPATITIS B SURFACE AG IA: CPT | Performed by: INTERNAL MEDICINE

## 2021-06-26 PROCEDURE — 83036 HEMOGLOBIN GLYCOSYLATED A1C: CPT | Performed by: INTERNAL MEDICINE

## 2021-06-26 PROCEDURE — 82306 VITAMIN D 25 HYDROXY: CPT | Performed by: INTERNAL MEDICINE

## 2021-06-26 PROCEDURE — 82728 ASSAY OF FERRITIN: CPT | Performed by: INTERNAL MEDICINE

## 2021-06-26 PROCEDURE — 86706 HEP B SURFACE ANTIBODY: CPT | Performed by: INTERNAL MEDICINE

## 2021-06-26 PROCEDURE — 80076 HEPATIC FUNCTION PANEL: CPT | Performed by: INTERNAL MEDICINE

## 2021-06-26 PROCEDURE — 86803 HEPATITIS C AB TEST: CPT | Performed by: INTERNAL MEDICINE

## 2021-06-29 ENCOUNTER — LAB ENCOUNTER (OUTPATIENT)
Dept: LAB | Age: 21
End: 2021-06-29
Attending: OBSTETRICS & GYNECOLOGY
Payer: COMMERCIAL

## 2021-06-29 ENCOUNTER — OFFICE VISIT (OUTPATIENT)
Dept: OBGYN CLINIC | Facility: CLINIC | Age: 21
End: 2021-06-29
Payer: COMMERCIAL

## 2021-06-29 VITALS
DIASTOLIC BLOOD PRESSURE: 80 MMHG | SYSTOLIC BLOOD PRESSURE: 120 MMHG | HEART RATE: 84 BPM | RESPIRATION RATE: 16 BRPM | BODY MASS INDEX: 39.93 KG/M2 | HEIGHT: 62 IN | WEIGHT: 217 LBS

## 2021-06-29 DIAGNOSIS — N93.9 ABNORMAL UTERINE BLEEDING (AUB): Primary | ICD-10-CM

## 2021-06-29 DIAGNOSIS — E66.01 MORBID OBESITY WITH BMI OF 40.0-44.9, ADULT (HCC): ICD-10-CM

## 2021-06-29 DIAGNOSIS — N93.9 ABNORMAL UTERINE BLEEDING (AUB): ICD-10-CM

## 2021-06-29 DIAGNOSIS — N91.1 SECONDARY AMENORRHEA: ICD-10-CM

## 2021-06-29 LAB
ESTRADIOL SERPL-MCNC: 38 PG/ML
FSH SERPL-ACNC: 7.2 MIU/ML
LH SERPL-ACNC: 10.4 MIU/ML
PROLACTIN SERPL-MCNC: 11.5 NG/ML

## 2021-06-29 PROCEDURE — 83001 ASSAY OF GONADOTROPIN (FSH): CPT | Performed by: OBSTETRICS & GYNECOLOGY

## 2021-06-29 PROCEDURE — 83002 ASSAY OF GONADOTROPIN (LH): CPT | Performed by: OBSTETRICS & GYNECOLOGY

## 2021-06-29 PROCEDURE — 84146 ASSAY OF PROLACTIN: CPT | Performed by: OBSTETRICS & GYNECOLOGY

## 2021-06-29 PROCEDURE — 3079F DIAST BP 80-89 MM HG: CPT | Performed by: OBSTETRICS & GYNECOLOGY

## 2021-06-29 PROCEDURE — 99215 OFFICE O/P EST HI 40 MIN: CPT | Performed by: OBSTETRICS & GYNECOLOGY

## 2021-06-29 PROCEDURE — 3074F SYST BP LT 130 MM HG: CPT | Performed by: OBSTETRICS & GYNECOLOGY

## 2021-06-29 PROCEDURE — 3008F BODY MASS INDEX DOCD: CPT | Performed by: OBSTETRICS & GYNECOLOGY

## 2021-06-29 PROCEDURE — 82670 ASSAY OF TOTAL ESTRADIOL: CPT | Performed by: OBSTETRICS & GYNECOLOGY

## 2021-06-29 RX ORDER — NORETHINDRONE ACETATE AND ETHINYL ESTRADIOL 1MG-20(21)
1 KIT ORAL DAILY
Qty: 84 TABLET | Refills: 3 | Status: SHIPPED | OUTPATIENT
Start: 2021-06-29 | End: 2021-09-30

## 2021-06-29 NOTE — PROGRESS NOTES
[facility]  Obstetrics and Gynecology Referral  History & Physical    CC: Patient is a new patient and referred for AUB    Subjective:     HPI: Jeanette Kennedy is a 21year old [place] female referred for AUB.  She reports AUB w/ secondary amenorrh Oral Tab, Take 50 mg by mouth daily. Takes along with 25 mg tablet daily , Disp: , Rfl: 0    No current facility-administered medications on file prior to visit.       All:  No Known Allergies    PMH:  Past Medical History:   Diagnosis Date   • Depression Friends and Family:       Frequency of Social Gatherings with Friends and Family:       Attends Bahai Services:       Active Member of Clubs or Organizations:       Attends Club or Organization Meetings:       Marital Status:   Intimate Partner Hira Haji improved with metformin   - additional labs ordered   - recommend pelvic US in office   - d/w patient PCOS including diagnostic criteria   - d/w patient risk of PCOS including menstrual irregularity, endometrial pathology, DM and metabolic syndrome  - d/w

## 2021-07-02 NOTE — PROGRESS NOTES
Spoke with Jose Cottrell in lab. Testosterone levels need to be drawn between 1442-3847 AM, so it was not drawn. Call to patient; no answer. Left message on VM requesting call back to office for test results. PATIENT WILL NEED TO HAVE TESTOSTERONE DRAWN.

## 2021-07-12 ENCOUNTER — LAB ENCOUNTER (OUTPATIENT)
Dept: LAB | Age: 21
End: 2021-07-12
Attending: OBSTETRICS & GYNECOLOGY
Payer: COMMERCIAL

## 2021-07-12 DIAGNOSIS — N93.9 ABNORMAL UTERINE BLEEDING (AUB): ICD-10-CM

## 2021-07-12 PROCEDURE — 84402 ASSAY OF FREE TESTOSTERONE: CPT | Performed by: OBSTETRICS & GYNECOLOGY

## 2021-07-12 PROCEDURE — 84403 ASSAY OF TOTAL TESTOSTERONE: CPT | Performed by: OBSTETRICS & GYNECOLOGY

## 2021-07-13 ENCOUNTER — OFFICE VISIT (OUTPATIENT)
Dept: FAMILY MEDICINE CLINIC | Facility: CLINIC | Age: 21
End: 2021-07-13
Payer: COMMERCIAL

## 2021-07-13 DIAGNOSIS — Z53.21 PATIENT LEFT WITHOUT BEING SEEN: Primary | ICD-10-CM

## 2021-07-13 NOTE — PROGRESS NOTES
Reports congestion X 3 days. After triaging, patient decided to wait a few days and come back for any worsening symptoms or no improvement. No charge.

## 2021-07-15 ENCOUNTER — ULTRASOUND ENCOUNTER (OUTPATIENT)
Dept: OBGYN CLINIC | Facility: CLINIC | Age: 21
End: 2021-07-15
Payer: COMMERCIAL

## 2021-07-15 DIAGNOSIS — N93.9 ABNORMAL UTERINE BLEEDING (AUB): Primary | ICD-10-CM

## 2021-07-15 DIAGNOSIS — N91.1 SECONDARY AMENORRHEA: ICD-10-CM

## 2021-07-15 PROCEDURE — 76856 US EXAM PELVIC COMPLETE: CPT | Performed by: OBSTETRICS & GYNECOLOGY

## 2021-07-16 NOTE — PROGRESS NOTES
Attempted to contact patient. Left message. Pelvic US reviewed. Overall reassuring. Findings are suggestive of PCOS. Patient also has secondary amenorrhea. Therefore, she does meet the criteria for PCOS as discuss in office.  I would advise to continue wi

## 2021-07-17 LAB
SEX HORMONE BINDING GLOBULIN: 9 NMOL/L
TESTOSTERONE -MS, BIOAVAILAB: 26.3 NG/DL
TESTOSTERONE, -MS/MS: 34 NG/DL
TESTOSTERONE, FREE -MS/MS: 8.8 PG/ML

## 2021-07-20 ENCOUNTER — APPOINTMENT (OUTPATIENT)
Dept: GENERAL RADIOLOGY | Age: 21
End: 2021-07-20
Attending: PHYSICIAN ASSISTANT
Payer: COMMERCIAL

## 2021-07-20 ENCOUNTER — HOSPITAL ENCOUNTER (EMERGENCY)
Age: 21
Discharge: HOME | End: 2021-07-20
Attending: EMERGENCY MEDICINE
Payer: COMMERCIAL

## 2021-07-20 VITALS
BODY MASS INDEX: 39.93 KG/M2 | WEIGHT: 217 LBS | HEART RATE: 84 BPM | DIASTOLIC BLOOD PRESSURE: 89 MMHG | HEIGHT: 62 IN | SYSTOLIC BLOOD PRESSURE: 127 MMHG | OXYGEN SATURATION: 99 % | TEMPERATURE: 99 F | RESPIRATION RATE: 18 BRPM
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
## 2021-07-20 DIAGNOSIS — S09.90XA INJURY OF HEAD, INITIAL ENCOUNTER: Primary | ICD-10-CM

## 2021-07-20 DIAGNOSIS — S16.1XXA STRAIN OF NECK MUSCLE, INITIAL ENCOUNTER: ICD-10-CM

## 2021-07-20 PROCEDURE — 72050 X-RAY EXAM NECK SPINE 4/5VWS: CPT | Performed by: PHYSICIAN ASSISTANT

## 2021-07-20 PROCEDURE — 99283 EMERGENCY DEPT VISIT LOW MDM: CPT | Performed by: EMERGENCY MEDICINE

## 2021-07-20 RX ORDER — ONDANSETRON 4 MG/1
4 TABLET, ORALLY DISINTEGRATING ORAL EVERY 4 HOURS PRN
Qty: 10 TABLET | Refills: 0 | Status: SHIPPED | OUTPATIENT
Start: 2021-07-20 | End: 2021-07-27

## 2021-07-20 RX ORDER — ONDANSETRON 4 MG/1
4 TABLET, ORALLY DISINTEGRATING ORAL ONCE
Status: COMPLETED | OUTPATIENT
Start: 2021-07-20 | End: 2021-07-20

## 2021-07-20 RX ORDER — IBUPROFEN 600 MG/1
600 TABLET ORAL ONCE
Status: COMPLETED | OUTPATIENT
Start: 2021-07-20 | End: 2021-07-20

## 2021-07-20 NOTE — ED INITIAL ASSESSMENT (HPI)
Did a front flip in the pool one hour ago, hit top of head, feels tender, slight dizziness, denies neck pain, denies loc

## 2021-07-21 NOTE — ED PROVIDER NOTES
Patient Seen in: [facility] Emergency Department In [place]      History   Patient presents with:  Head Neck Injury    Stated Complaint: hit head off bottom of pool and felt dizziness resolved     HPI/Subjective:   HPI    Mamimontse Lucas is a 61-year-old female speech  Neuro: Cranial nerves III - XII tested and intact.        Coordination: finger to nose intact      Extremity strength        Upper:       LEFT     RIGHT                  strength: 5/5                                        strength: 5/5 Zofran. I discussed the red flag symptoms that would warrant CT imaging, which are not currently occurring based on her presentation. She expresses understanding is agreeable. I do plan to do an x-ray of her neck, as she is tender over the midline.     Garrison Essex on NSAID use. MDM      The patient is encouraged to return if any concerning symptoms arise. Additional verbal discharge instructions are given and discussed. Discharge medications are discussed.  The patient is in good condition throughout the v

## 2021-07-21 NOTE — ED NOTES
I reviewed that chart and discussed the case. I have examined the patient and noted the patient was in the pool. She was in the shallow when she was not on the side of the pool she was actually in the water itself.   She states she was doing a flip in the feels comfortable with this I discussed that I if she has any increasing headache nausea vomiting to return immediately to the emergency room.   I did recommend his cervical spine x-ray which was done    XR CERVICAL SPINE (4VIEWS) (CPT=72050)    Result Date

## 2021-08-19 ENCOUNTER — PATIENT MESSAGE (OUTPATIENT)
Dept: INTERNAL MEDICINE CLINIC | Facility: CLINIC | Age: 21
End: 2021-08-19

## 2021-08-19 DIAGNOSIS — H02.9 EYELID LESION: Primary | ICD-10-CM

## 2021-08-20 NOTE — TELEPHONE ENCOUNTER
From: Carlos Guadarrama  To: Cezar Powell MD  Sent: 8/19/2021 4:08 PM CDT  Subject: Referral Request    Hi Dr Mani Mensah!     I was wondering if you could give me a referral to a dermatologist or whoever can best help me for this issue: I have had a small wart on

## 2021-09-30 ENCOUNTER — OFFICE VISIT (OUTPATIENT)
Dept: OBGYN CLINIC | Facility: CLINIC | Age: 21
End: 2021-09-30
Payer: COMMERCIAL

## 2021-09-30 VITALS — SYSTOLIC BLOOD PRESSURE: 108 MMHG | WEIGHT: 210.81 LBS | DIASTOLIC BLOOD PRESSURE: 66 MMHG | BODY MASS INDEX: 39 KG/M2

## 2021-09-30 DIAGNOSIS — Z01.419 WELL WOMAN EXAM WITH ROUTINE GYNECOLOGICAL EXAM: Primary | ICD-10-CM

## 2021-09-30 DIAGNOSIS — Z11.3 SCREENING EXAMINATION FOR STD (SEXUALLY TRANSMITTED DISEASE): ICD-10-CM

## 2021-09-30 DIAGNOSIS — E28.2 PCOS (POLYCYSTIC OVARIAN SYNDROME): ICD-10-CM

## 2021-09-30 DIAGNOSIS — Z12.4 ENCOUNTER FOR SCREENING FOR CERVICAL CANCER: ICD-10-CM

## 2021-09-30 DIAGNOSIS — N91.1 SECONDARY AMENORRHEA: ICD-10-CM

## 2021-09-30 DIAGNOSIS — N93.9 ABNORMAL UTERINE BLEEDING (AUB): ICD-10-CM

## 2021-09-30 PROCEDURE — 3074F SYST BP LT 130 MM HG: CPT | Performed by: OBSTETRICS & GYNECOLOGY

## 2021-09-30 PROCEDURE — 99213 OFFICE O/P EST LOW 20 MIN: CPT | Performed by: OBSTETRICS & GYNECOLOGY

## 2021-09-30 PROCEDURE — 3078F DIAST BP <80 MM HG: CPT | Performed by: OBSTETRICS & GYNECOLOGY

## 2021-09-30 RX ORDER — NORETHINDRONE ACETATE AND ETHINYL ESTRADIOL 1MG-20(21)
1 KIT ORAL DAILY
Qty: 84 TABLET | Refills: 3 | Status: SHIPPED | OUTPATIENT
Start: 2021-09-30 | End: 2021-11-01

## 2021-09-30 NOTE — PROGRESS NOTES
[facility]  Obstetrics and Gynecology  Follow Up Progress Note    Subjective:     Vasyl Valente [facility] 24[facility] who was last seen in office on 6/29/21 for AUB w/ secondary amenorrhea and presents for follow up after being diagnos perfusion  Chest: non-labored breathing, no tachypnea   Abdominal exam: soft, nontender, nondistended  Pelvic exam: deferred   Ext: non-tender, no edema        Assessment:     Bryanna Coleman is a 24year old [facility] female who presents for PCOS follow up Gladis Welch MD  [facility] - OBGYN

## 2021-11-01 NOTE — CERTIFICATION
Ref: 405 \[facility] [place] [facility]\"" 5/3-403, 5/3-602, 5/3-607, 5/3-610    5/3-702, 5/3-813, 5/4-306, 5/4-402, 5/4-403    5/4-405, 5/4-501, 5/4-611, 8/9-096   Inpatient Certificate  Re: Margret Carlos    (name)     I personally informed the above-named individual of the [facility], to suffer mental or emotional deterioration and is reasonably expected, after such deterioration, to meet the criteria of either paragraph one or paragraph two above;   []  An individual who is developmentally disabled and unless treate Electronically signed by Kristine Latham MD   Title: MD Printed Name: Radha Kimble [phone] ([birth year]) Inpatient Certificate    Printed by [employer]

## 2021-11-01 NOTE — ED QUICK NOTES
Per [facility] @ dispatch, Enrique Hilario were all called and \"there are no resources available. \"    Attempted to call [facility], who also reports that they have no available ALS ambulances.

## 2021-11-01 NOTE — PROGRESS NOTES
11/01/21 0814   COVID Exposure Risk Screening   Have you been practicing social distancing? Yes   Have you been wearing a mask when in the community? Yes   Are the people you live with following social distancing and wearing a mask?  Yes  (Lives with mom Scheduled procedure with Patient at    Telephone Information:   Mobile [phone]      Scheduled Via: Case Request Order for  [facility]   If non-ambulatory location, select reason: Not applicable  Did patient request a specific facility other than MD's preferred facility? No; If so, which facility?   Procedure date: 3/8/19   Procedure time: 800 ; Did patient request this specific time? Yes  Rep Contacted?: n/a  Notified patient about receiving an animated Colleen program? Yes    The following have been confirmed:  Insurance name confirmed as [facility], will be the same at time of procedure?: Yes Ins Accepted at Facility? Yes  Latex Allergy No  Diabetic No  Sleep Apnea No  Diuretic/Water pill No  Defibrillator/Pacemaker No  MRSA hx No  Blood thinners: Coumadin (Warfarin) or Plavix No      Aspirin No      Phentermine (diet pill) No    Pre-Op testing required No, Patient informed Yes  Prep required? Yes, Pharmacy is [facility] [address] [place] [address] [address] AT [facility]     (include location and/or phone number); Briefly reviewed? Yes; Prep cost range discussed? Yes  If procedure is scheduled 7 days or less, patient was told to  prep letter?: n/a

## 2021-11-01 NOTE — ED NOTES
Called [facility] and was informed that they do not accept [employer] insurance. Informed [facility] that the insurance company had been contacted and stated that [facility] was in network.     Intake at [address] [place] [address] Drive also spoke with the Business Office who stated that

## 2021-11-01 NOTE — ED NOTES
Called [facility] and spoke with Florecita to make referral to [facility]. Provided Clinicals over the phone and insurance information. Florecita stated that she is waiting to hear back from [facility] in terms of bed availability and will call back.

## 2021-11-01 NOTE — ED PROVIDER NOTES
Patient has been medically cleared. Awaiting placement. On petition and certificate. Will need psychiatric placement.

## 2021-11-01 NOTE — ED NOTES
Called [facility] and spoke with Rodney Olmos to make referral over the phone. Luis Martinez stated that beds were tight, but would accept referral. Referral Packet was faxed for review.

## 2021-11-01 NOTE — ED NOTES
Received a call from Anton at [facility] stating that they just need to verify insurance through [employer] for acceptance. Anton will call back.

## 2021-11-01 NOTE — ED NOTES
[facility] and communicated that pt's packet was sent up to the MD for review. Representative stated that d/t pt's in the ED it will be a some time before  is reviewed.

## 2021-11-01 NOTE — ED NOTES
[address] [place] [address] called and stated that they are unable to accommodate due to being full. Referral Packet was forwarded to Yara Cabrera for review.

## 2021-11-01 NOTE — BH PROGRESS NOTE
Discussed COVID test result and Exposure Risk Screener with [facility], who feels pt would be ok to have a roommate from a COVID risk stance.

## 2021-11-01 NOTE — ED QUICK NOTES
Unintentionally dislodged IV while sleeping. Arm cleaned with alcohol wipe and bandaged with 4x4 and tape. PT given new linens and gown.

## 2021-11-01 NOTE — ED QUICK NOTES
Poison control called, spoke with Dipti Manley. Per poison control there is no reversal agent to a Topamax overdose, just supportive care based off of patient's symptoms. Patient can have ataxia, drowsiness and slurry speech, symptoms of \"alcohol intoxication\".

## 2021-11-01 NOTE — ED NOTES
Received a call back from [facility] who was able to take referral for bed request at [facility]. Rapid COVID test was completed last night.  [facility] will not accept the rapid test. Florecita advised that once genexpert is resulted to call back and mily

## 2021-11-01 NOTE — CONSULTS
[facility]  Report of Consultation    Margret Carlos Patient Status:  Emergency    2000 MRN [id number]   Location [address] Attending Sonia Luke MD   [facility] Day # 0 PCP Robert Cason MD     Consulting Service: [facility] pills and so on until she reached 12 total tablets. She reports this took about 5 to 10 minutes.   After taking the 12 pills she called her friend who then called her mom her mother came in her room after informed her they were going to the hospital.  Pt d Paternal Grandfather    • Other (PCOS) Sister     History reviewed. No pertinent surgical history.      ROS:  Pt denies malaise, fatigue, vision changes, dry mouth, chest pain, palpitations, nausea, abdominal pain, N/V/D constipation, dysuria, urinary hesit discharge  Connectedness to family/ friends/ community institutions     Problem List:  depression, anxiety and suicidal ideation  Impression:  Patricia Moss is a 24year old female with history of major depression evaluated at [facility] status post

## 2021-11-01 NOTE — ED PROVIDER NOTES
Patient Seen in: [facility] Emergency Department In [place]      History   Patient presents with:  Poisoning/Overdose  Eval-P    Stated Complaint: took 12 sertraline 30min prior to arrival     Subjective:   HPI    22-year-old female with a history of depr 10/28/2021   SpO2 96%   BMI 39.11 kg/m²         Physical Exam    General: Alert and oriented. No acute distress  HEENT: Normocephalic. Atraumatic. Pupils equally round active light  Cardiovascular exam: Regular rate and rhythm.   No murmurs, rubs, gallop for panel order CBC With Differential With Platelet.   Procedure                               Abnormality         Status                     ---------                               -----------         ------                     CBC W/ [id number] oximetry was applied. Patient had an IV established and labs were drawn. Patient continued to be observed here in the emergency department. Patient remained stable throughout the emergency department observation period.     Findings  of the patient's Prescribed:  Current Discharge Medication List

## 2021-11-01 NOTE — BH LEVEL OF CARE ASSESSMENT
Crisis Evaluation Assessment    Lb Joshi YOB: 2000   Age 24year old MRN [id number]   Location [address] Attending Annette Portillo MD      Patient's legal sex: female  Patient identifies as: female  Patient's bir performed?: in person  1. Have you wished you were dead or wished you could go to sleep and not wake up? (past 30 days): Yes  2. Have you actually had any thoughts of killing yourself? (past 30 days): Yes  3.  Have you been thinking about how you might kill Factors:   Protective Factors: \"I'm motivated to live because of the people around me\". Review of Psychiatric Systems:  Pt reports she started to experience depression for the first time around 15. Pt denies having any specific triggers.  She states sh applicable):  IBW Calculations  Weight: 207 lb  BMI (Calculated): 39.1  IBW LBS [place]: 105 LBS  IBW %: 197.14 %  IBW + 10%: 115.5 LBS  IBW - 10%: 94.5 LBS                                                                    Abuse Assessment:  Abuse Assessmen sadness d/t inconsistently taking her prescribed medication. CSSR score was a 13. Pt denies HI, SIB, sxs of psychosis, and substance use/abuse. Pt denies any hx of psychiatric hospitalizations.  She reports having a current therapist and psychiatrist.

## 2021-11-01 NOTE — ED QUICK NOTES
Spoke with kandy from [facility] for nurse to nurse, states intake will call back shortly

## 2021-11-01 NOTE — ED NOTES
Received a call from [employer] stating that they were having a difficult time verifying insurance benefits. Yisel, [phone] and spoke with Odette in Benefits and Eligibility.  She stated that Malka's insura

## 2021-11-01 NOTE — ED NOTES
Spoke with Sara at [employer] who stated that they will need the name of the person who [facility]. Spoke with Nurse to inform her of request from [employer]. Nurse will put in a note once Poison Control is contacted.     Fax

## 2021-11-01 NOTE — ED NOTES
Called [facility] to follow-up on referral status. [facility] unable to accommodate due to no bed availability.

## 2021-11-02 ENCOUNTER — HOSPITAL ENCOUNTER (INPATIENT)
Age: 21
LOS: 3 days | Discharge: HOME | DRG: 885 | End: 2021-11-05
Attending: PSYCHIATRY & NEUROLOGY | Admitting: PSYCHIATRY & NEUROLOGY

## 2021-11-02 DIAGNOSIS — F33.2 MAJOR DEPRESSIVE DISORDER, RECURRENT, SEVERE WITHOUT PSYCHOTIC FEATURES (CMD): ICD-10-CM

## 2021-11-02 DIAGNOSIS — F41.1 GENERALIZED ANXIETY DISORDER: ICD-10-CM

## 2021-11-02 PROBLEM — E28.2 PCOS (POLYCYSTIC OVARIAN SYNDROME): Status: ACTIVE | Noted: 2021-11-02

## 2021-11-02 PROBLEM — T14.91XA SUICIDE ATTEMPT (CMD): Status: ACTIVE | Noted: 2021-11-02

## 2021-11-02 PROBLEM — R73.03 PREDIABETES: Status: ACTIVE | Noted: 2021-11-02

## 2021-11-02 PROCEDURE — 93005 ELECTROCARDIOGRAM TRACING: CPT | Performed by: INTERNAL MEDICINE

## 2021-11-02 PROCEDURE — 99222 1ST HOSP IP/OBS MODERATE 55: CPT | Performed by: INTERNAL MEDICINE

## 2021-11-02 PROCEDURE — 93010 ELECTROCARDIOGRAM REPORT: CPT | Performed by: INTERNAL MEDICINE

## 2021-11-02 PROCEDURE — 10004577 HB ROOM CHARGE PSYCH

## 2021-11-02 RX ORDER — LORAZEPAM 2 MG/ML
2 INJECTION INTRAMUSCULAR EVERY 6 HOURS PRN
Status: DISCONTINUED | OUTPATIENT
Start: 2021-11-02 | End: 2021-11-05 | Stop reason: HOSPADM

## 2021-11-02 RX ORDER — HALOPERIDOL 5 MG/ML
5 INJECTION INTRAMUSCULAR EVERY 6 HOURS PRN
Status: DISCONTINUED | OUTPATIENT
Start: 2021-11-02 | End: 2021-11-05 | Stop reason: HOSPADM

## 2021-11-02 RX ORDER — LORAZEPAM 2 MG/1
2 TABLET ORAL EVERY 6 HOURS PRN
Status: DISCONTINUED | OUTPATIENT
Start: 2021-11-02 | End: 2021-11-05 | Stop reason: HOSPADM

## 2021-11-02 RX ORDER — HYDROXYZINE HYDROCHLORIDE 25 MG/1
25 TABLET, FILM COATED ORAL 3 TIMES DAILY PRN
Status: DISCONTINUED | OUTPATIENT
Start: 2021-11-02 | End: 2021-11-05 | Stop reason: HOSPADM

## 2021-11-02 RX ORDER — MAGNESIUM HYDROXIDE/ALUMINUM HYDROXICE/SIMETHICONE 120; 1200; 1200 MG/30ML; MG/30ML; MG/30ML
30 SUSPENSION ORAL 4 TIMES DAILY PRN
Status: DISCONTINUED | OUTPATIENT
Start: 2021-11-02 | End: 2021-11-05 | Stop reason: HOSPADM

## 2021-11-02 RX ORDER — HALOPERIDOL 5 MG/1
5 TABLET ORAL EVERY 6 HOURS PRN
Status: DISCONTINUED | OUTPATIENT
Start: 2021-11-02 | End: 2021-11-05 | Stop reason: HOSPADM

## 2021-11-02 RX ORDER — ACETAMINOPHEN 325 MG/1
650 TABLET ORAL EVERY 6 HOURS PRN
Status: DISCONTINUED | OUTPATIENT
Start: 2021-11-02 | End: 2021-11-05 | Stop reason: HOSPADM

## 2021-11-02 ASSESSMENT — PAIN SCALES - GENERAL: PAINLEVEL_OUTOF10: 0

## 2021-11-02 ASSESSMENT — ACTIVITIES OF DAILY LIVING (ADL)
ADL_BEFORE_ADMISSION: INDEPENDENT
RECENT_DECLINE_ADL: NO
ADL_SCORE: 12
ADL_SHORT_OF_BREATH: NO
CHRONIC_PAIN_PRESENT: NO

## 2021-11-02 ASSESSMENT — LIFESTYLE VARIABLES
TOBACCO_USE_STATUS_IN_THE_LAST_30_DAYS: NO TOBACCO USED IN THE LAST 30 DAYS
AUDIT-C TOTAL SCORE: 0
HAVE YOU EVER BEEN EXPOSED TO OTHER VERY DISTURBING, TRAUMATIC OR ANXIETY PROVOKING EVENTS IN YOUR LIFETIME?: NO
ALCOHOL_USE_STATUS: NO OR LOW RISK WITH VALIDATED TOOL
HOW OFTEN DO YOU HAVE A DRINK CONTAINING ALCOHOL: NEVER
HOW OFTEN DO YOU HAVE 6 OR MORE DRINKS ON ONE OCCASION: NEVER
HAVE YOU EVER BEEN VERBALLY, EMOTIONALLY, PHYSICALLY OR SEXUALLY ABUSED, OR ABUSED VIA SOCIAL MEDIA?: NO
HOW MANY STANDARD DRINKS CONTAINING ALCOHOL DO YOU HAVE ON A TYPICAL DAY: 0,1 OR 2

## 2021-11-02 ASSESSMENT — COGNITIVE AND FUNCTIONAL STATUS - GENERAL
ARE YOU DEAF OR DO YOU HAVE SERIOUS DIFFICULTY  HEARING: NO
ARE YOU BLIND OR DO YOU HAVE SERIOUS DIFFICULTY SEEING, EVEN WHEN WEARING GLASSES: NO

## 2021-11-02 NOTE — ED PROVIDER NOTES
71-year-old woman, here for evaluation of depression and suicidal ideation intentional medication overdose, has been medically cleared, home medications ordered, is awaiting placement. Patient is resting comfortably without complaint.

## 2021-11-02 NOTE — ED NOTES
Unique Beyer from [facility] states that he is unsure if they accept pt's insurance. He states they have no beds currently at [facility]. He recommends to call back during day shift with the referral so they can try to verify insurance and see if there are any discharges.

## 2021-11-02 NOTE — ED QUICK NOTES
Patient accepted at [facility] in [facility] [place]. Nurse to nurse report given to [facility]. Patient going to [address] [facility] bed 544 bed 2.

## 2021-11-02 NOTE — ED PROVIDER NOTES
44-year-old female who presented overnight last night for depression, suicidal ideation, intentional overdose. Patient has been observed and medically cleared here. Has remained calm and cooperative. Still awaiting placement. No issues.

## 2021-11-02 NOTE — ED NOTES
Pre-auth completed, spoke with Eloy Tan ([phone]) . Insurance requires clinicals to be faxed for review to determine timeframe auth'd. Auth # [id number]  Fax # for clinicals - [phone]    Accepting doc is Dr. Jaspal Wellington.  Pre-cert ppw scanned int

## 2021-11-02 NOTE — ED PROVIDER NOTES
Patient has been accepted at [facility] for inpatient psychiatric treatment. We are currently awaiting a bed assignment and to give report. There have been no events with the patient thus far on my shift.

## 2021-11-02 NOTE — ED NOTES
Faxed clinicals to Fax# [phone] as requested. Spoke with Intake at [facility] who requested that precert documentation be faxed to Fax# [phone] so information can be reviewed.

## 2021-11-02 NOTE — ED QUICK NOTES
Patient sleeping, but easily arousable when spoken to. Offered breakfast menu but declined at this time, states that she wants to continue sleeping.

## 2021-11-03 PROBLEM — F33.2 MAJOR DEPRESSIVE DISORDER, RECURRENT, SEVERE WITHOUT PSYCHOTIC FEATURES (CMD): Status: ACTIVE | Noted: 2021-11-03

## 2021-11-03 PROBLEM — F41.1 GENERALIZED ANXIETY DISORDER: Status: ACTIVE | Noted: 2021-11-03

## 2021-11-03 LAB
ANION GAP SERPL CALC-SCNC: 13 MMOL/L (ref 10–20)
ATRIAL RATE (BPM): 91
BASOPHILS # BLD: 0 K/MCL (ref 0–0.3)
BASOPHILS NFR BLD: 1 %
BUN SERPL-MCNC: 28 MG/DL (ref 6–20)
BUN/CREAT SERPL: 30 (ref 7–25)
CALCIUM SERPL-MCNC: 10 MG/DL (ref 8.4–10.2)
CHLORIDE SERPL-SCNC: 108 MMOL/L (ref 98–107)
CHOLEST SERPL-MCNC: 252 MG/DL
CHOLEST/HDLC SERPL: 4.2 {RATIO}
CO2 SERPL-SCNC: 21 MMOL/L (ref 21–32)
CREAT SERPL-MCNC: 0.93 MG/DL (ref 0.51–0.95)
DEPRECATED RDW RBC: 42.3 FL (ref 39–50)
EOSINOPHIL # BLD: 0.1 K/MCL (ref 0–0.5)
EOSINOPHIL NFR BLD: 1 %
ERYTHROCYTE [DISTWIDTH] IN BLOOD: 12.2 % (ref 11–15)
FASTING DURATION TIME PATIENT: ABNORMAL H
FASTING DURATION TIME PATIENT: ABNORMAL H
GFR SERPLBLD BASED ON 1.73 SQ M-ARVRAT: 88 ML/MIN
GLUCOSE SERPL-MCNC: 89 MG/DL (ref 70–99)
HBA1C MFR BLD: 5.3 % (ref 4.5–5.6)
HCT VFR BLD CALC: 44.2 % (ref 36–46.5)
HDLC SERPL-MCNC: 60 MG/DL
HGB BLD-MCNC: 14.8 G/DL (ref 12–15.5)
IMM GRANULOCYTES # BLD AUTO: 0 K/MCL (ref 0–0.2)
IMM GRANULOCYTES # BLD: 0 %
LDLC SERPL CALC-MCNC: 146 MG/DL
LYMPHOCYTES # BLD: 2.1 K/MCL (ref 1–4.8)
LYMPHOCYTES NFR BLD: 32 %
MCH RBC QN AUTO: 31.2 PG (ref 26–34)
MCHC RBC AUTO-ENTMCNC: 33.5 G/DL (ref 32–36.5)
MCV RBC AUTO: 93.1 FL (ref 78–100)
MONOCYTES # BLD: 0.3 K/MCL (ref 0.3–0.9)
MONOCYTES NFR BLD: 5 %
NEUTROPHILS # BLD: 4 K/MCL (ref 1.8–7.7)
NEUTROPHILS NFR BLD: 61 %
NONHDLC SERPL-MCNC: 192 MG/DL
NRBC BLD MANUAL-RTO: 0 /100 WBC
P AXIS (DEGREES): 24
PLATELET # BLD AUTO: 396 K/MCL (ref 140–450)
POTASSIUM SERPL-SCNC: 3.8 MMOL/L (ref 3.4–5.1)
PR-INTERVAL (MSEC): 174
QRS-INTERVAL (MSEC): 100
QT-INTERVAL (MSEC): 372
QTC: 458
R AXIS (DEGREES): -24
RBC # BLD: 4.75 MIL/MCL (ref 4–5.2)
REPORT TEXT: NORMAL
SODIUM SERPL-SCNC: 138 MMOL/L (ref 135–145)
T AXIS (DEGREES): -2
TRIGL SERPL-MCNC: 230 MG/DL
TSH SERPL-ACNC: 0.48 MCUNITS/ML (ref 0.35–5)
VENTRICULAR RATE EKG/MIN (BPM): 91
WBC # BLD: 6.6 K/MCL (ref 4.2–11)

## 2021-11-03 PROCEDURE — 82306 VITAMIN D 25 HYDROXY: CPT | Performed by: NURSE PRACTITIONER

## 2021-11-03 PROCEDURE — 10004577 HB ROOM CHARGE PSYCH

## 2021-11-03 PROCEDURE — 80048 BASIC METABOLIC PNL TOTAL CA: CPT | Performed by: INTERNAL MEDICINE

## 2021-11-03 PROCEDURE — 83036 HEMOGLOBIN GLYCOSYLATED A1C: CPT | Performed by: PSYCHIATRY & NEUROLOGY

## 2021-11-03 PROCEDURE — 99223 1ST HOSP IP/OBS HIGH 75: CPT | Performed by: NURSE PRACTITIONER

## 2021-11-03 PROCEDURE — 85025 COMPLETE CBC W/AUTO DIFF WBC: CPT | Performed by: INTERNAL MEDICINE

## 2021-11-03 PROCEDURE — 10002803 HB RX 637: Performed by: NURSE PRACTITIONER

## 2021-11-03 PROCEDURE — 80061 LIPID PANEL: CPT | Performed by: PSYCHIATRY & NEUROLOGY

## 2021-11-03 PROCEDURE — 84443 ASSAY THYROID STIM HORMONE: CPT | Performed by: PSYCHIATRY & NEUROLOGY

## 2021-11-03 PROCEDURE — 36415 COLL VENOUS BLD VENIPUNCTURE: CPT | Performed by: PSYCHIATRY & NEUROLOGY

## 2021-11-03 RX ADMIN — SERTRALINE HYDROCHLORIDE 75 MG: 25 TABLET, FILM COATED ORAL at 20:16

## 2021-11-03 ASSESSMENT — PAIN SCALES - GENERAL
PAINLEVEL_OUTOF10: 0

## 2021-11-04 LAB — 25(OH)D3+25(OH)D2 SERPL-MCNC: 17.1 NG/ML (ref 30–100)

## 2021-11-04 PROCEDURE — 10004577 HB ROOM CHARGE PSYCH

## 2021-11-04 PROCEDURE — U0003 INFECTIOUS AGENT DETECTION BY NUCLEIC ACID (DNA OR RNA); SEVERE ACUTE RESPIRATORY SYNDROME CORONAVIRUS 2 (SARS-COV-2) (CORONAVIRUS DISEASE [COVID-19]), AMPLIFIED PROBE TECHNIQUE, MAKING USE OF HIGH THROUGHPUT TECHNOLOGIES AS DESCRIBED BY CMS-2020-01-R: HCPCS | Performed by: PSYCHIATRY & NEUROLOGY

## 2021-11-04 PROCEDURE — 99232 SBSQ HOSP IP/OBS MODERATE 35: CPT | Performed by: NURSE PRACTITIONER

## 2021-11-04 PROCEDURE — 10002803 HB RX 637: Performed by: NURSE PRACTITIONER

## 2021-11-04 RX ORDER — CHOLECALCIFEROL (VITAMIN D3) 1250 MCG
1.25 CAPSULE ORAL
Status: DISCONTINUED | OUTPATIENT
Start: 2021-11-05 | End: 2021-11-05 | Stop reason: HOSPADM

## 2021-11-04 RX ADMIN — SERTRALINE HYDROCHLORIDE 75 MG: 25 TABLET, FILM COATED ORAL at 21:17

## 2021-11-04 ASSESSMENT — PAIN SCALES - GENERAL
PAINLEVEL_OUTOF10: 0
PAINLEVEL_OUTOF10: 0

## 2021-11-05 VITALS
HEIGHT: 61 IN | HEART RATE: 101 BPM | BODY MASS INDEX: 38.71 KG/M2 | OXYGEN SATURATION: 98 % | TEMPERATURE: 98.2 F | DIASTOLIC BLOOD PRESSURE: 78 MMHG | SYSTOLIC BLOOD PRESSURE: 118 MMHG | WEIGHT: 205.03 LBS | RESPIRATION RATE: 16 BRPM

## 2021-11-05 LAB
SARS-COV-2 RNA RESP QL NAA+PROBE: NOT DETECTED
SERVICE CMNT-IMP: NORMAL
SERVICE CMNT-IMP: NORMAL

## 2021-11-05 PROCEDURE — 10002803 HB RX 637: Performed by: NURSE PRACTITIONER

## 2021-11-05 PROCEDURE — 99238 HOSP IP/OBS DSCHRG MGMT 30/<: CPT | Performed by: PSYCHIATRY & NEUROLOGY

## 2021-11-05 RX ORDER — CHOLECALCIFEROL (VITAMIN D3) 1250 MCG
1.25 CAPSULE ORAL
Qty: 4 CAPSULE | Refills: 0 | Status: SHIPPED | OUTPATIENT
Start: 2021-11-08

## 2021-11-05 RX ADMIN — OFLOXACIN 1.25 MG: 300 TABLET, COATED ORAL at 08:28

## 2021-11-05 ASSESSMENT — PAIN SCALES - GENERAL: PAINLEVEL_OUTOF10: 0

## 2021-11-10 ENCOUNTER — APPOINTMENT (OUTPATIENT)
Dept: BEHAVIORAL HEALTH | Age: 21
End: 2021-11-10
Attending: PSYCHIATRY & NEUROLOGY

## 2021-11-11 ENCOUNTER — APPOINTMENT (OUTPATIENT)
Dept: BEHAVIORAL HEALTH | Age: 21
End: 2021-11-11
Attending: PSYCHIATRY & NEUROLOGY

## 2021-11-11 ENCOUNTER — TELEPHONE (OUTPATIENT)
Dept: BEHAVIORAL HEALTH | Age: 21
End: 2021-11-11

## 2021-11-11 PROBLEM — E55.9 VITAMIN D DEFICIENCY: Status: ACTIVE | Noted: 2021-11-11

## 2021-11-16 ENCOUNTER — APPOINTMENT (OUTPATIENT)
Dept: BEHAVIORAL HEALTH | Age: 21
End: 2021-11-16
Attending: PSYCHIATRY & NEUROLOGY

## 2021-11-17 ENCOUNTER — APPOINTMENT (OUTPATIENT)
Dept: BEHAVIORAL HEALTH | Age: 21
End: 2021-11-17
Attending: PSYCHIATRY & NEUROLOGY

## 2021-11-18 ENCOUNTER — APPOINTMENT (OUTPATIENT)
Dept: BEHAVIORAL HEALTH | Age: 21
End: 2021-11-18
Attending: PSYCHIATRY & NEUROLOGY

## 2021-11-23 ENCOUNTER — APPOINTMENT (OUTPATIENT)
Dept: BEHAVIORAL HEALTH | Age: 21
End: 2021-11-23
Attending: PSYCHIATRY & NEUROLOGY

## 2021-11-24 ENCOUNTER — APPOINTMENT (OUTPATIENT)
Dept: BEHAVIORAL HEALTH | Age: 21
End: 2021-11-24
Attending: PSYCHIATRY & NEUROLOGY

## 2021-11-25 ENCOUNTER — APPOINTMENT (OUTPATIENT)
Dept: BEHAVIORAL HEALTH | Age: 21
End: 2021-11-25
Attending: PSYCHIATRY & NEUROLOGY

## 2021-11-30 ENCOUNTER — APPOINTMENT (OUTPATIENT)
Dept: BEHAVIORAL HEALTH | Age: 21
End: 2021-11-30
Attending: PSYCHIATRY & NEUROLOGY

## 2021-12-01 ENCOUNTER — APPOINTMENT (OUTPATIENT)
Dept: BEHAVIORAL HEALTH | Age: 21
End: 2021-12-01
Attending: PSYCHIATRY & NEUROLOGY

## 2021-12-02 ENCOUNTER — APPOINTMENT (OUTPATIENT)
Dept: BEHAVIORAL HEALTH | Age: 21
End: 2021-12-02
Attending: PSYCHIATRY & NEUROLOGY

## 2021-12-07 ENCOUNTER — APPOINTMENT (OUTPATIENT)
Dept: BEHAVIORAL HEALTH | Age: 21
End: 2021-12-07
Attending: PSYCHIATRY & NEUROLOGY

## 2021-12-08 ENCOUNTER — APPOINTMENT (OUTPATIENT)
Dept: BEHAVIORAL HEALTH | Age: 21
End: 2021-12-08
Attending: PSYCHIATRY & NEUROLOGY

## 2021-12-09 ENCOUNTER — APPOINTMENT (OUTPATIENT)
Dept: BEHAVIORAL HEALTH | Age: 21
End: 2021-12-09
Attending: PSYCHIATRY & NEUROLOGY

## 2021-12-14 ENCOUNTER — APPOINTMENT (OUTPATIENT)
Dept: BEHAVIORAL HEALTH | Age: 21
End: 2021-12-14
Attending: PSYCHIATRY & NEUROLOGY

## 2021-12-15 ENCOUNTER — APPOINTMENT (OUTPATIENT)
Dept: BEHAVIORAL HEALTH | Age: 21
End: 2021-12-15
Attending: PSYCHIATRY & NEUROLOGY

## 2021-12-16 ENCOUNTER — APPOINTMENT (OUTPATIENT)
Dept: BEHAVIORAL HEALTH | Age: 21
End: 2021-12-16
Attending: PSYCHIATRY & NEUROLOGY

## 2021-12-21 ENCOUNTER — APPOINTMENT (OUTPATIENT)
Dept: BEHAVIORAL HEALTH | Age: 21
End: 2021-12-21
Attending: PSYCHIATRY & NEUROLOGY

## 2021-12-22 ENCOUNTER — APPOINTMENT (OUTPATIENT)
Dept: BEHAVIORAL HEALTH | Age: 21
End: 2021-12-22
Attending: PSYCHIATRY & NEUROLOGY

## 2021-12-23 ENCOUNTER — APPOINTMENT (OUTPATIENT)
Dept: BEHAVIORAL HEALTH | Age: 21
End: 2021-12-23
Attending: PSYCHIATRY & NEUROLOGY

## 2021-12-26 ENCOUNTER — IMMUNIZATION (OUTPATIENT)
Dept: LAB | Facility: HOSPITAL | Age: 21
End: 2021-12-26
Attending: EMERGENCY MEDICINE
Payer: COMMERCIAL

## 2021-12-26 DIAGNOSIS — Z23 NEED FOR VACCINATION: Primary | ICD-10-CM

## 2021-12-26 PROCEDURE — 0004A SARSCOV2 VAC 30MCG/0.3ML IM: CPT

## 2021-12-28 ENCOUNTER — APPOINTMENT (OUTPATIENT)
Dept: BEHAVIORAL HEALTH | Age: 21
End: 2021-12-28
Attending: PSYCHIATRY & NEUROLOGY

## 2021-12-29 ENCOUNTER — APPOINTMENT (OUTPATIENT)
Dept: BEHAVIORAL HEALTH | Age: 21
End: 2021-12-29
Attending: PSYCHIATRY & NEUROLOGY

## 2021-12-30 ENCOUNTER — APPOINTMENT (OUTPATIENT)
Dept: BEHAVIORAL HEALTH | Age: 21
End: 2021-12-30
Attending: PSYCHIATRY & NEUROLOGY

## 2022-01-01 NOTE — LETTER
Your Child's Health  Six-Month-Old Visit      Cody Mccallum  December 20, 2022    There were no vitals taken for this visit.  Weight:     YOUR CHILD'S 6 MONTH OLD VISIT    Key points at this age…    • Car seat safety is critical! Make sure your baby is safely and properly riding in a rear-facing car seat.    • Continue to breast feed your baby at this age if you are able. If breastfeeding, you will need to make sure they are getting some extra iron by this age.     • Thinking about safety in your home is essential as your baby will be rolling, scooting and crawling in the next few months.    NUTRITION:    • Continue to breastfeed if you can--it is still the best choice until one year of age. If you are not breastfeeding, your baby should stay on iron-fortified formula until one year of age.  •  babies may need a little extra iron after age 4 to 6 months. Usually starting some baby foods (especially infant cereals and strained meats which are high in iron) can meet this iron need. Two servings of one of these iron rich foods will typically meet daily iron needs. (Switching from a pure vitamin D supplement to one with iron OR a multivitamin with iron drop can also ensure your baby is getting enough iron, but most full term babies will be able to get enough iron as they start food. It is, however, important to continue a vitamin D supplement for as long as you are primarily breastfeeding.)   • Your baby may be ready for finger foods soon. Offer small bits of soft foods (nothing much bigger than a Cheerio ®) to avoid choking.  • Foods that are important to avoid completely in the first year of life are chunky things that your baby could choke on (like grapes, popcorn, nuts, hot dogs, raw carrots or celery) and honey (which has very rarely been associated with a dangerous infection).    Recommendations for offering babies what were once considered potential “allergenic foods” (meaning they might  Date: 2/3/2020    Patient Name: Margret Carlos          To Whom it may concern: This letter has been written at the patient's request. The above patient was seen at the [facility] for treatment of a medical condition.     This patient shoul increase the risk of food allergies) have changed. Careful scientific studies have shown that children are NOT more likely to develop food allergies when they are exposed to these foods early. Once your baby is tolerating some of the more traditional ‘first foods’ (cereals, veggies, strained meats, fruits, etc.), it is okay to try some of the foods that were previously avoided in the first year (or longer). Specifically, after 4 to 6 months of age, it is safe to offer:  o Small amounts of cheese and yogurt (but still wait until one year to offer cow’s milk for them to drink)  o Small amounts of egg, either cooked (scrambled, hard boiled, etc.) or in baked goods  o Small amounts of nut butters (peanut butter, tree nut butter). However, you should NOT offer nut products IF THERE IS A SIBLING WITH A PEANUT ALLERGY. (If that is the case, talk to your doctor about whether an allergy evaluation is recommended.) Do not offer whole nuts--your baby could choke on these.   o Small amounts of fish- salmon, shrimp and canned light tuna are generally safe choices to feed infants and children 1 to 2 times per week. Avoid fish that may contain high levels of mercury (shark, swordfish, levi mackerel).  Search online for “eating fish in [place]” for information about choosing fish. Another source for questions is the Center for Food Safety: 1-377.181.2055.    Juice is NOT an important part of your baby’s diet. Juice has a large amount of added sugar which is bad for baby’s teeth and may put them at risk for becoming overweight. Eating fruit is much healthier than drinking it! The American Academy of Pediatrics recommends that children under one year of age should NOT drink any juice because of these potential health problems.       DENTAL CARE:   • Most babies start teething between 4 and 9 months (but up to 12 months is normal). Teething children may have no symptoms at all, or they may experience drooling, crabbiness, or changes  in eating. Babies may seem warm when they are fussing, but teething does not cause true fevers--if your teething baby has a fever, it is probably due to some other cause.   • Babies should have their gums wiped clean with a clean cloth 1 to 2 times a day. As soon as teeth erupt, they should be brushed twice a day with a tiny smear (the size of a grain of rice!) of regular (fluoridated) toothpaste.  • Fluoride is very important. The easiest way to get this is to make sure your baby gets some tap water (in their formula preparation or in cooking). If you have well water, you should have it tested for fluoride content to determine whether your child might need fluoride supplements.   • Don’t share utensils or clean things off in your mouth before putting them in your baby’s mouth. This transfers germs to their mouth and can increase their risk of decay.     DEVELOPMENT / BEHAVIOR: Your baby will soon be sitting up by themselves. And in the next few months, most babies will start getting onto their hands and knees, followed by rocking back and forth, and then crawling (some babies crawl backward before they go forward and some never crawl until after they walk!).  They enjoy babbling in a back and forth “conversation” with you and will soon be repeating sounds (\"omar,\" \"mama,\" or \"baba\").  At this age, a baby grabs everything, and it all goes into their mouth, so you should be very careful to protect your baby from choking and poisoning. Some babies at this age can sleep all night, often ten to twelve hours.  Most babies take at least one nap a day, but some don't nap at all.    Read books! Even if it is only for a few minutes at a time, reading will benefit your child’s language skills and brain development. Skip the TV and videos (they have not been proven to help anything!) and  a book instead. If you show a lot of interest in your cell phone, your baby probably will also-- so try to minimize your phone use  when interacting with your baby.       ACCIDENT PREVENTION:  1.  Falls:  Use irizarry on stairways and at the entrances to rooms that cannot be child-proofed.  2.  Burns: Prevent scald burns by adjusting your hot-water heater temperature to 120-125°F. Since your baby will be moving around on their own soon, make sure to block them from any access to hot items or surfaces (irons, radiators, ovens). Make sure smoke detectors and carbon monoxide detectors are installed and properly working.   3.  Walker injuries:  Walkers are actually dangerous for babies (because of the risk of falling); they are not recommended at any age. Stationary saucers and/or jumpers are safer, but it is important to make sure your baby is appropriately strapped in (and always supervised!).  4.  Electrical shocks:  Protect Cody from access to electrical cords and outlets by using safety plugs in sockets and blocking outlets with furniture whenever possible.  5.  Poisoning:  Remove the following items from reach or place them up high out of reach or in a locked cabinet:     Batteries (button batteries, in particular, are VERY dangerous if swallowed)  Cleaning products     Kerosene (lamp oil)     Paint     Pesticides     Purses (because they often contain medicines and choking hazards)     Plants      Alcohol   Medicines, including vitamins. Always keep and use the original safety caps that come with medications; do not transfer medicines to non-child-proofed or unlabeled containers. Be especially careful when around older persons (either in their home or in yours) because they may be in the habit of keeping their medicines in open view or in non-childproofed pill containers.     Call the Poison Center at 1-399.774.4526 for any known or suspected poisoning. (Put this number in your cell phone if it’s not already there!)      CAR SAFETY:  An approved rear-facing car seat is required by [place] law for Cody. It should always be in the  back-seat. If you are not sure the car seat is installed or adjusted correctly, a couple helpful web sites are www.safercar.gov and the [place] DOT website (search for “car seats”). Remember that the seat straps need to be very snug to protect your baby in case of an accident (so no thick coats or snowsuits while riding in the car during the winter!). Use your child's car seat even for short trips (most accidents occur close to home!) and don't forget to wear your own seat belt.      SMOKING:    Do not let anyone smoke around your baby in the house OR in the car.   Exposure to cigarette smoke will increase your baby’s risk of SIDS (crib death), asthma, ear infections, and respiratory infections (pneumonia).    SUN AND WATER SAFETY: Try to protect your baby from being in direct sun as much as you can. Always try to keep them in the shade and use protective clothing (including hats and sunglasses) when you are out. After age 6 months, it is safe to use infant sunscreen (choose one with an SPF of 15 or higher). Apply carefully according to directions, and always apply at least 20 to 30 minutes before going out in the sun. Also, never leave your baby alone in or around water (pool, sink, tub), even for a moment.  Wearing life jackets and “swimmies” will not protect your baby from drowning!     LEAD EXPOSURE:  If there is any lead in your home your baby will be more at risk for lead poisoning in the next few months since they will be moving around more on their own. Lead poisoning can have a harmful and irreversible effect on your child’s behavior, intelligence and learning potential, so it is very important to prevent and screen for lead exposure. Let us know if any of the following apply:  1.  Your home (or any place that your baby spends time) was built before 1978 and has peeling or chipping paint. If you live in an older home, find out if it has lead pipes.   2.  There is another child in your home being followed  or treated for a high lead level.   3.  Someone in your home (or another caretaker for Cody) has a job or hobby involving lead (soldering, battery plants, recycling, casting, stained glass, etc.).  4.  Lead can also be found in pottery, pewter, and folk medicines.    If you have questions about older neighborhoods in [place] that might have lead connecting (or service) pipes, do an internet search for \"[place] lead awareness and drinking water safety\". From this web page, you can search for your address to find out if your home was built with lead service lines. There are also helpful hints regarding water safety on this site.     MEDICATION FOR FEVER OR PAIN:   Acetaminophen liquid (e.g., Tylenol or Tempra) may be given every four hours as needed for pain or fever.  Be sure to check which product CONCENTRATION you are using.    INFANT/CHILDREN’S Tylenol/Acetaminophen  (160 MG/5 mL)  Child’s Weight: Dose:  12 - 17 pounds:   80 mg (2.5 mL  (1/2 Teaspoon))  18 - 23 pounds:   120 mg (3.75 mL (3/4 Teaspoon))    Beginning at 6 months of age, Infant's or Children's Ibuprofen (e.g., Advil or Motrin) may be given every six hours as needed for pain or fever.    INFANT Ibuprofen (50 mg/1.25 ml)  liquid (for example Advil or Motrin) may be given every 6 hours as needed for pain or fever.    Child’s Weight: Dose:  13 - 17 pounds:   60 - 80 mg (1.5 - 2.0 mL ( 1/3 - 2/5 Teaspoon))  18 - 23 pounds:   80 - 100 mg (2.0 - 2.5 mL (2/5 - 1/2 Teaspoon))      CHILDREN'S Ibuprofen (100 mg/5 mL) liquid (for example Advil or Motrin) may be given every 6 hours as needed for pain or fever.    Child’s Weight: Dose:  13 - 17 pounds:   60 - 80 mg (3.0 - 4.0 mL (3/5 - 4/5 Teaspoon))  18 - 23 pounds:   80 - 100 mg (4.0 - 5.0 mL (4/5 - 1 Teaspoon))    If Cody is outside these weight ranges, call your pediatrician's office for advice.     Most Recent Immunizations   Administered Date(s) Administered   • DTaP/Hep B/IPV 2022   • Hib  (PRP-OMP) 2022   • Pneumococcal Conjugate 13 Valent Vacc (Prevnar 13) 2022   Deferred Date(s) Deferred   • Hep B, adolescent or pediatric 2022       If Cody develops any of the following reactions within 72 hours after an immunization, notify your pediatrician by calling the pediatric phone nurse:  1. A temperature of 105 degrees or above.  2. More than 3 hours of continuous crying.  3. A shrill, high-pitched cry.  4. A pale, limp spell.  5. A seizure or fainting spell. In this case, you should call 911 or go immediately to the emergency room.      NEXT VISIT: NINE MONTHS OF AGE    Thank you for entrusting your care to [facility].    Also, check out “Children’s Health” on the [facility] Blog for updates on timely topics regarding children’s health!

## 2022-01-04 ENCOUNTER — APPOINTMENT (OUTPATIENT)
Dept: BEHAVIORAL HEALTH | Age: 22
End: 2022-01-04
Attending: PSYCHIATRY & NEUROLOGY

## 2022-01-05 ENCOUNTER — APPOINTMENT (OUTPATIENT)
Dept: BEHAVIORAL HEALTH | Age: 22
End: 2022-01-05
Attending: PSYCHIATRY & NEUROLOGY

## 2022-01-06 ENCOUNTER — APPOINTMENT (OUTPATIENT)
Dept: BEHAVIORAL HEALTH | Age: 22
End: 2022-01-06
Attending: PSYCHIATRY & NEUROLOGY

## 2022-01-11 ENCOUNTER — APPOINTMENT (OUTPATIENT)
Dept: BEHAVIORAL HEALTH | Age: 22
End: 2022-01-11
Attending: PSYCHIATRY & NEUROLOGY

## 2022-01-12 ENCOUNTER — APPOINTMENT (OUTPATIENT)
Dept: BEHAVIORAL HEALTH | Age: 22
End: 2022-01-12
Attending: PSYCHIATRY & NEUROLOGY

## 2022-01-13 ENCOUNTER — APPOINTMENT (OUTPATIENT)
Dept: BEHAVIORAL HEALTH | Age: 22
End: 2022-01-13
Attending: PSYCHIATRY & NEUROLOGY

## 2022-01-18 ENCOUNTER — APPOINTMENT (OUTPATIENT)
Dept: BEHAVIORAL HEALTH | Age: 22
End: 2022-01-18
Attending: PSYCHIATRY & NEUROLOGY

## 2022-01-19 ENCOUNTER — APPOINTMENT (OUTPATIENT)
Dept: BEHAVIORAL HEALTH | Age: 22
End: 2022-01-19
Attending: PSYCHIATRY & NEUROLOGY

## 2022-01-20 ENCOUNTER — APPOINTMENT (OUTPATIENT)
Dept: BEHAVIORAL HEALTH | Age: 22
End: 2022-01-20
Attending: PSYCHIATRY & NEUROLOGY

## 2022-01-25 ENCOUNTER — APPOINTMENT (OUTPATIENT)
Dept: BEHAVIORAL HEALTH | Age: 22
End: 2022-01-25
Attending: PSYCHIATRY & NEUROLOGY

## 2022-01-26 ENCOUNTER — APPOINTMENT (OUTPATIENT)
Dept: BEHAVIORAL HEALTH | Age: 22
End: 2022-01-26
Attending: PSYCHIATRY & NEUROLOGY

## 2022-01-27 ENCOUNTER — APPOINTMENT (OUTPATIENT)
Dept: BEHAVIORAL HEALTH | Age: 22
End: 2022-01-27
Attending: PSYCHIATRY & NEUROLOGY

## 2022-02-01 ENCOUNTER — APPOINTMENT (OUTPATIENT)
Dept: BEHAVIORAL HEALTH | Age: 22
End: 2022-02-01
Attending: PSYCHIATRY & NEUROLOGY

## 2022-02-02 ENCOUNTER — APPOINTMENT (OUTPATIENT)
Dept: BEHAVIORAL HEALTH | Age: 22
End: 2022-02-02
Attending: PSYCHIATRY & NEUROLOGY

## 2022-02-03 ENCOUNTER — APPOINTMENT (OUTPATIENT)
Dept: BEHAVIORAL HEALTH | Age: 22
End: 2022-02-03
Attending: PSYCHIATRY & NEUROLOGY

## 2022-03-10 ENCOUNTER — APPOINTMENT (OUTPATIENT)
Dept: GENERAL RADIOLOGY | Age: 22
End: 2022-03-10
Attending: NURSE PRACTITIONER
Payer: COMMERCIAL

## 2022-03-10 ENCOUNTER — HOSPITAL ENCOUNTER (OUTPATIENT)
Age: 22
Discharge: HOME | End: 2022-03-10
Payer: COMMERCIAL

## 2022-03-10 VITALS
WEIGHT: 259 LBS | DIASTOLIC BLOOD PRESSURE: 73 MMHG | TEMPERATURE: 98 F | BODY MASS INDEX: 47.66 KG/M2 | HEIGHT: 62 IN | SYSTOLIC BLOOD PRESSURE: 125 MMHG | RESPIRATION RATE: 16 BRPM | HEART RATE: 97 BPM | OXYGEN SATURATION: 100 %

## 2022-03-10 DIAGNOSIS — S69.92XA INJURY OF LEFT WRIST, INITIAL ENCOUNTER: Primary | ICD-10-CM

## 2022-03-10 PROCEDURE — 73130 X-RAY EXAM OF HAND: CPT | Performed by: NURSE PRACTITIONER

## 2022-03-10 PROCEDURE — 99213 OFFICE O/P EST LOW 20 MIN: CPT

## 2022-03-10 PROCEDURE — 73110 X-RAY EXAM OF WRIST: CPT | Performed by: NURSE PRACTITIONER

## 2022-03-10 NOTE — ED INITIAL ASSESSMENT (HPI)
Pt aox4 Pt c/o left hand and left wrist pain after falling while roller skating. Pt states placed arm back too catch fall. Pt has pain with movement to left wrist and hand. Upon Rn assessment no deformity, swelling or bruising noted.

## 2022-06-15 ENCOUNTER — HOSPITAL ENCOUNTER (OUTPATIENT)
Age: 22
Discharge: HOME | End: 2022-06-15
Payer: COMMERCIAL

## 2022-06-15 VITALS
OXYGEN SATURATION: 98 % | RESPIRATION RATE: 17 BRPM | WEIGHT: 214 LBS | TEMPERATURE: 98 F | DIASTOLIC BLOOD PRESSURE: 73 MMHG | SYSTOLIC BLOOD PRESSURE: 114 MMHG | BODY MASS INDEX: 39.38 KG/M2 | HEART RATE: 85 BPM | HEIGHT: 62 IN

## 2022-06-15 DIAGNOSIS — R68.89 FLU-LIKE SYMPTOMS: ICD-10-CM

## 2022-06-15 DIAGNOSIS — U07.1 LAB TEST POSITIVE FOR DETECTION OF COVID-19 VIRUS: Primary | ICD-10-CM

## 2022-06-15 LAB — SARS-COV-2 RNA RESP QL NAA+PROBE: DETECTED

## 2022-06-15 PROCEDURE — 99212 OFFICE O/P EST SF 10 MIN: CPT

## 2022-06-15 PROCEDURE — 99213 OFFICE O/P EST LOW 20 MIN: CPT

## 2022-06-15 RX ORDER — SERTRALINE HYDROCHLORIDE 100 MG/1
100 TABLET, FILM COATED ORAL NIGHTLY
COMMUNITY
Start: 2022-03-14

## 2022-08-01 ENCOUNTER — OFFICE VISIT (OUTPATIENT)
Dept: INTERNAL MEDICINE CLINIC | Facility: CLINIC | Age: 22
End: 2022-08-01
Payer: COMMERCIAL

## 2022-08-01 ENCOUNTER — LAB ENCOUNTER (OUTPATIENT)
Dept: LAB | Age: 22
End: 2022-08-01
Attending: INTERNAL MEDICINE

## 2022-08-01 VITALS
TEMPERATURE: 99 F | RESPIRATION RATE: 16 BRPM | DIASTOLIC BLOOD PRESSURE: 84 MMHG | BODY MASS INDEX: 40.52 KG/M2 | WEIGHT: 220.19 LBS | OXYGEN SATURATION: 100 % | HEART RATE: 73 BPM | SYSTOLIC BLOOD PRESSURE: 118 MMHG | HEIGHT: 62 IN

## 2022-08-01 DIAGNOSIS — Z23 NEED FOR TDAP VACCINATION: ICD-10-CM

## 2022-08-01 DIAGNOSIS — E28.2 PCOS (POLYCYSTIC OVARIAN SYNDROME): ICD-10-CM

## 2022-08-01 DIAGNOSIS — E55.9 HYPOVITAMINOSIS D: ICD-10-CM

## 2022-08-01 DIAGNOSIS — Z00.00 LABORATORY EXAM ORDERED AS PART OF ROUTINE GENERAL MEDICAL EXAMINATION: ICD-10-CM

## 2022-08-01 DIAGNOSIS — Z00.00 ENCOUNTER FOR ROUTINE ADULT MEDICAL EXAMINATION: Primary | ICD-10-CM

## 2022-08-01 LAB
ALBUMIN SERPL-MCNC: 3.7 G/DL (ref 3.4–5)
ALBUMIN/GLOB SERPL: 0.9 {RATIO} (ref 1–2)
ALP LIVER SERPL-CCNC: 44 U/L
ALT SERPL-CCNC: 30 U/L
ANION GAP SERPL CALC-SCNC: 7 MMOL/L (ref 0–18)
AST SERPL-CCNC: 14 U/L (ref 15–37)
BASOPHILS # BLD AUTO: 0.05 X10(3) UL (ref 0–0.2)
BASOPHILS NFR BLD AUTO: 0.7 %
BILIRUB SERPL-MCNC: 0.3 MG/DL (ref 0.1–2)
BUN BLD-MCNC: 17 MG/DL (ref 7–18)
CALCIUM BLD-MCNC: 9.6 MG/DL (ref 8.5–10.1)
CHLORIDE SERPL-SCNC: 105 MMOL/L (ref 98–112)
CHOLEST SERPL-MCNC: 197 MG/DL (ref ?–200)
CO2 SERPL-SCNC: 23 MMOL/L (ref 21–32)
CREAT BLD-MCNC: 0.71 MG/DL
EOSINOPHIL # BLD AUTO: 0.11 X10(3) UL (ref 0–0.7)
EOSINOPHIL NFR BLD AUTO: 1.5 %
ERYTHROCYTE [DISTWIDTH] IN BLOOD BY AUTOMATED COUNT: 11.9 %
EST. AVERAGE GLUCOSE BLD GHB EST-MCNC: 111 MG/DL (ref 68–126)
FASTING PATIENT LIPID ANSWER: YES
FASTING STATUS PATIENT QL REPORTED: YES
GLOBULIN PLAS-MCNC: 4.1 G/DL (ref 2.8–4.4)
GLUCOSE BLD-MCNC: 90 MG/DL (ref 70–99)
HBA1C MFR BLD: 5.5 % (ref ?–5.7)
HCT VFR BLD AUTO: 40.8 %
HDLC SERPL-MCNC: 61 MG/DL (ref 40–59)
HGB BLD-MCNC: 13.4 G/DL
IMM GRANULOCYTES # BLD AUTO: 0.02 X10(3) UL (ref 0–1)
IMM GRANULOCYTES NFR BLD: 0.3 %
LDLC SERPL CALC-MCNC: 109 MG/DL (ref ?–100)
LYMPHOCYTES # BLD AUTO: 1.54 X10(3) UL (ref 1–4)
LYMPHOCYTES NFR BLD AUTO: 21.4 %
MCH RBC QN AUTO: 32.1 PG (ref 26–34)
MCHC RBC AUTO-ENTMCNC: 32.8 G/DL (ref 31–37)
MCV RBC AUTO: 97.6 FL
MONOCYTES # BLD AUTO: 0.33 X10(3) UL (ref 0.1–1)
MONOCYTES NFR BLD AUTO: 4.6 %
NEUTROPHILS # BLD AUTO: 5.15 X10 (3) UL (ref 1.5–7.7)
NEUTROPHILS # BLD AUTO: 5.15 X10(3) UL (ref 1.5–7.7)
NEUTROPHILS NFR BLD AUTO: 71.5 %
NONHDLC SERPL-MCNC: 136 MG/DL (ref ?–130)
OSMOLALITY SERPL CALC.SUM OF ELEC: 281 MOSM/KG (ref 275–295)
PLATELET # BLD AUTO: 346 10(3)UL (ref 150–450)
POTASSIUM SERPL-SCNC: 4.3 MMOL/L (ref 3.5–5.1)
PROT SERPL-MCNC: 7.8 G/DL (ref 6.4–8.2)
RBC # BLD AUTO: 4.18 X10(6)UL
SODIUM SERPL-SCNC: 135 MMOL/L (ref 136–145)
TRIGL SERPL-MCNC: 153 MG/DL (ref 30–149)
TSI SER-ACNC: 0.97 MIU/ML (ref 0.36–3.74)
VIT D+METAB SERPL-MCNC: 18.2 NG/ML (ref 30–100)
VLDLC SERPL CALC-MCNC: 26 MG/DL (ref 0–30)
WBC # BLD AUTO: 7.2 X10(3) UL (ref 4–11)

## 2022-08-01 PROCEDURE — 36415 COLL VENOUS BLD VENIPUNCTURE: CPT

## 2022-08-01 PROCEDURE — 80053 COMPREHEN METABOLIC PANEL: CPT

## 2022-08-01 PROCEDURE — 85025 COMPLETE CBC W/AUTO DIFF WBC: CPT

## 2022-08-01 PROCEDURE — 80061 LIPID PANEL: CPT

## 2022-08-01 PROCEDURE — 83036 HEMOGLOBIN GLYCOSYLATED A1C: CPT

## 2022-08-01 PROCEDURE — 82306 VITAMIN D 25 HYDROXY: CPT

## 2022-08-01 PROCEDURE — 84443 ASSAY THYROID STIM HORMONE: CPT

## 2022-08-03 RX ORDER — ERGOCALCIFEROL 1.25 MG/1
50000 CAPSULE ORAL WEEKLY
Qty: 12 CAPSULE | Refills: 0 | Status: SHIPPED | OUTPATIENT
Start: 2022-08-03 | End: 2022-11-01

## 2022-08-05 ENCOUNTER — TELEPHONE (OUTPATIENT)
Dept: INTERNAL MEDICINE CLINIC | Facility: CLINIC | Age: 22
End: 2022-08-05

## 2022-08-05 NOTE — TELEPHONE ENCOUNTER
Faxed signed ROR to [facility] to obtain patients immunization records   Fax confirmation received   Sent to scan and copy placed in accordion       Awaiting records

## 2022-08-08 NOTE — TELEPHONE ENCOUNTER
records received   Entered into [facility]   Placed in [address] [place] [address] for review     Second varicella vaccine was not given due to documented immunity by history   LVM for patient to ask is she had chicken pox as a child

## 2022-09-24 ENCOUNTER — OFFICE VISIT (OUTPATIENT)
Dept: OBGYN CLINIC | Facility: CLINIC | Age: 22
End: 2022-09-24

## 2022-09-24 VITALS
HEIGHT: 62.75 IN | BODY MASS INDEX: 40.19 KG/M2 | HEART RATE: 82 BPM | DIASTOLIC BLOOD PRESSURE: 82 MMHG | WEIGHT: 224 LBS | SYSTOLIC BLOOD PRESSURE: 126 MMHG

## 2022-09-24 DIAGNOSIS — E28.2 PCOS (POLYCYSTIC OVARIAN SYNDROME): ICD-10-CM

## 2022-09-24 DIAGNOSIS — Z11.3 SCREENING EXAMINATION FOR STD (SEXUALLY TRANSMITTED DISEASE): ICD-10-CM

## 2022-09-24 DIAGNOSIS — N93.9 ABNORMAL UTERINE BLEEDING (AUB): ICD-10-CM

## 2022-09-24 DIAGNOSIS — Z01.419 WELL WOMAN EXAM WITH ROUTINE GYNECOLOGICAL EXAM: Primary | ICD-10-CM

## 2022-09-24 DIAGNOSIS — N91.1 SECONDARY AMENORRHEA: ICD-10-CM

## 2022-09-24 DIAGNOSIS — Z12.4 SCREENING FOR CERVICAL CANCER: ICD-10-CM

## 2022-09-24 PROCEDURE — 99395 PREV VISIT EST AGE 18-39: CPT | Performed by: OBSTETRICS & GYNECOLOGY

## 2022-09-24 PROCEDURE — 3079F DIAST BP 80-89 MM HG: CPT | Performed by: OBSTETRICS & GYNECOLOGY

## 2022-09-24 PROCEDURE — 3008F BODY MASS INDEX DOCD: CPT | Performed by: OBSTETRICS & GYNECOLOGY

## 2022-09-24 PROCEDURE — 87591 N.GONORRHOEAE DNA AMP PROB: CPT | Performed by: OBSTETRICS & GYNECOLOGY

## 2022-09-24 PROCEDURE — 3074F SYST BP LT 130 MM HG: CPT | Performed by: OBSTETRICS & GYNECOLOGY

## 2022-09-24 PROCEDURE — 87491 CHLMYD TRACH DNA AMP PROBE: CPT | Performed by: OBSTETRICS & GYNECOLOGY

## 2022-09-24 RX ORDER — NORETHINDRONE ACETATE AND ETHINYL ESTRADIOL 1MG-20(21)
1 KIT ORAL DAILY
COMMUNITY
Start: 2022-09-10

## 2022-09-24 RX ORDER — NORETHINDRONE ACETATE AND ETHINYL ESTRADIOL 1.5-30(21)
1 KIT ORAL DAILY
Qty: 84 TABLET | Refills: 3 | Status: SHIPPED | OUTPATIENT
Start: 2022-09-24 | End: 2023-09-24

## 2022-09-27 LAB
C TRACH DNA SPEC QL NAA+PROBE: NEGATIVE
N GONORRHOEA DNA SPEC QL NAA+PROBE: NEGATIVE

## 2022-10-13 ENCOUNTER — HOSPITAL ENCOUNTER (OUTPATIENT)
Age: 22
Discharge: HOME | End: 2022-10-13
Attending: EMERGENCY MEDICINE
Payer: COMMERCIAL

## 2022-10-13 VITALS
OXYGEN SATURATION: 95 % | BODY MASS INDEX: 39.56 KG/M2 | RESPIRATION RATE: 16 BRPM | WEIGHT: 215 LBS | HEART RATE: 100 BPM | TEMPERATURE: 98 F | DIASTOLIC BLOOD PRESSURE: 71 MMHG | SYSTOLIC BLOOD PRESSURE: 122 MMHG | HEIGHT: 62 IN

## 2022-10-13 DIAGNOSIS — J06.9 VIRAL URI: Primary | ICD-10-CM

## 2022-10-13 PROCEDURE — 99213 OFFICE O/P EST LOW 20 MIN: CPT

## 2022-10-13 RX ORDER — BENZONATATE 100 MG/1
100 CAPSULE ORAL 3 TIMES DAILY PRN
Qty: 30 CAPSULE | Refills: 0 | Status: SHIPPED | OUTPATIENT
Start: 2022-10-13 | End: 2022-11-12

## 2022-10-13 NOTE — ED INITIAL ASSESSMENT (HPI)
Patient presents to IC with c/o sinus congestion x 4 days and went to chest yesterday.+cough productive of green sputum. No fever. Remoted h/o bronchitis and pneumonia.

## 2022-10-20 ENCOUNTER — APPOINTMENT (OUTPATIENT)
Dept: GENERAL RADIOLOGY | Age: 22
End: 2022-10-20
Attending: PHYSICIAN ASSISTANT
Payer: COMMERCIAL

## 2022-10-20 ENCOUNTER — HOSPITAL ENCOUNTER (OUTPATIENT)
Age: 22
Discharge: HOME | End: 2022-10-20
Payer: COMMERCIAL

## 2022-10-20 VITALS
HEART RATE: 91 BPM | DIASTOLIC BLOOD PRESSURE: 74 MMHG | TEMPERATURE: 98 F | OXYGEN SATURATION: 96 % | WEIGHT: 215 LBS | SYSTOLIC BLOOD PRESSURE: 128 MMHG | BODY MASS INDEX: 39.56 KG/M2 | RESPIRATION RATE: 16 BRPM | HEIGHT: 62 IN

## 2022-10-20 DIAGNOSIS — J20.9 ACUTE BRONCHITIS, UNSPECIFIED ORGANISM: Primary | ICD-10-CM

## 2022-10-20 DIAGNOSIS — R06.2 WHEEZING: ICD-10-CM

## 2022-10-20 LAB — SARS-COV-2 RNA RESP QL NAA+PROBE: NOT DETECTED

## 2022-10-20 PROCEDURE — 99214 OFFICE O/P EST MOD 30 MIN: CPT

## 2022-10-20 PROCEDURE — 71046 X-RAY EXAM CHEST 2 VIEWS: CPT | Performed by: PHYSICIAN ASSISTANT

## 2022-10-20 PROCEDURE — 94640 AIRWAY INHALATION TREATMENT: CPT

## 2022-10-20 RX ORDER — PREDNISONE 20 MG/1
40 TABLET ORAL DAILY
Qty: 10 TABLET | Refills: 0 | Status: SHIPPED | OUTPATIENT
Start: 2022-10-20 | End: 2022-10-25

## 2022-10-20 RX ORDER — ALBUTEROL SULFATE 90 UG/1
2 AEROSOL, METERED RESPIRATORY (INHALATION) EVERY 4 HOURS PRN
Qty: 1 EACH | Refills: 0 | Status: SHIPPED | OUTPATIENT
Start: 2022-10-20 | End: 2022-11-19

## 2022-10-20 RX ORDER — IPRATROPIUM BROMIDE AND ALBUTEROL SULFATE 2.5; .5 MG/3ML; MG/3ML
3 SOLUTION RESPIRATORY (INHALATION) ONCE
Status: COMPLETED | OUTPATIENT
Start: 2022-10-20 | End: 2022-10-20

## 2022-10-20 NOTE — ED INITIAL ASSESSMENT (HPI)
Since last Monday, c/o productive cough and bilateral ear pressure/sinus pressure. Denies fevers. Tessalon prescribed here last week is not helping.

## 2023-03-06 RX ORDER — NORETHINDRONE ACETATE AND ETHINYL ESTRADIOL 1MG-20(21)
1 KIT ORAL DAILY
Qty: 84 TABLET | Refills: 0 | OUTPATIENT
Start: 2023-03-06

## 2023-03-06 NOTE — TELEPHONE ENCOUNTER
Last OV: 9/24/22 with Dr. Shay Mendez for annual  Last refill date: 9/24/22  Follow-up: 1 year  Next appt.: none scheduled; due 9/2023    Refills available

## 2023-03-08 RX ORDER — NORETHINDRONE ACETATE AND ETHINYL ESTRADIOL 1MG-20(21)
1 KIT ORAL DAILY
Qty: 28 TABLET | Refills: 0 | Status: SHIPPED | OUTPATIENT
Start: 2023-03-08

## 2023-03-08 NOTE — TELEPHONE ENCOUNTER
Shree -ANTHONY  Filled 9-24-22  Qty 84  3 refills  No upcoming appt.   LOV 9/24/22 Dr. Benedicto Sanabria

## 2023-03-09 RX ORDER — NORETHINDRONE ACETATE AND ETHINYL ESTRADIOL 1MG-20(21)
1 KIT ORAL DAILY
Qty: 84 TABLET | Refills: 0 | OUTPATIENT
Start: 2023-03-09

## 2023-06-03 RX ORDER — SERTRALINE HYDROCHLORIDE 100 MG/1
100 TABLET, FILM COATED ORAL NIGHTLY
Refills: 0 | OUTPATIENT
Start: 2023-06-03

## 2023-06-07 RX ORDER — SERTRALINE HYDROCHLORIDE 100 MG/1
TABLET, FILM COATED ORAL
Qty: 90 TABLET | Refills: 0 | OUTPATIENT
Start: 2023-06-07

## 2023-07-03 DIAGNOSIS — F48.9 MOOD PROBLEM: ICD-10-CM

## 2023-07-05 RX ORDER — SERTRALINE HYDROCHLORIDE 100 MG/1
TABLET, FILM COATED ORAL
Qty: 30 TABLET | Refills: 0 | OUTPATIENT
Start: 2023-07-05

## 2023-07-05 RX ORDER — SERTRALINE HYDROCHLORIDE 100 MG/1
TABLET, FILM COATED ORAL
Qty: 90 TABLET | Refills: 0 | OUTPATIENT
Start: 2023-07-05

## 2023-07-05 RX ORDER — SERTRALINE HYDROCHLORIDE 100 MG/1
100 TABLET, FILM COATED ORAL NIGHTLY
Qty: 30 TABLET | Refills: 0 | OUTPATIENT
Start: 2023-07-05

## 2023-09-10 DIAGNOSIS — N91.1 SECONDARY AMENORRHEA: ICD-10-CM

## 2023-09-10 DIAGNOSIS — E28.2 PCOS (POLYCYSTIC OVARIAN SYNDROME): ICD-10-CM

## 2023-09-10 DIAGNOSIS — N93.9 ABNORMAL UTERINE BLEEDING (AUB): ICD-10-CM

## 2023-09-11 RX ORDER — NORETHINDRONE ACETATE AND ETHINYL ESTRADIOL AND FERROUS FUMARATE 1.5-30(21)
1 KIT ORAL DAILY
Qty: 84 TABLET | Refills: 3 | OUTPATIENT
Start: 2023-09-11

## 2023-09-11 NOTE — TELEPHONE ENCOUNTER
Last OV: 9/24/22  Last refill date: 9/24/22 Blisovi FE 1.5/30 #84 with 3 refills  Follow-up: 1 year  Next appt.: none scheduled  Refill request denied. Pharmacy notified that patient needs appointment.

## 2023-09-14 ENCOUNTER — PATIENT MESSAGE (OUTPATIENT)
Dept: OBGYN CLINIC | Facility: CLINIC | Age: 23
End: 2023-09-14

## 2023-09-14 DIAGNOSIS — E28.2 PCOS (POLYCYSTIC OVARIAN SYNDROME): ICD-10-CM

## 2023-09-14 DIAGNOSIS — N93.9 ABNORMAL UTERINE BLEEDING (AUB): ICD-10-CM

## 2023-09-14 DIAGNOSIS — N91.1 SECONDARY AMENORRHEA: ICD-10-CM

## 2023-09-14 RX ORDER — NORETHINDRONE ACETATE AND ETHINYL ESTRADIOL 1.5-30(21)
1 KIT ORAL DAILY
Qty: 84 TABLET | Refills: 3
Start: 2023-09-14 | End: 2024-09-13

## 2023-09-15 RX ORDER — NORETHINDRONE ACETATE AND ETHINYL ESTRADIOL 1.5-30(21)
1 KIT ORAL DAILY
Qty: 84 TABLET | Refills: 1 | Status: SHIPPED | OUTPATIENT
Start: 2023-09-15

## 2023-11-13 ENCOUNTER — OFFICE VISIT (OUTPATIENT)
Dept: INTERNAL MEDICINE CLINIC | Facility: CLINIC | Age: 23
End: 2023-11-13
Payer: COMMERCIAL

## 2023-11-13 VITALS
WEIGHT: 218.19 LBS | DIASTOLIC BLOOD PRESSURE: 82 MMHG | TEMPERATURE: 98 F | SYSTOLIC BLOOD PRESSURE: 120 MMHG | HEIGHT: 62 IN | BODY MASS INDEX: 40.15 KG/M2 | RESPIRATION RATE: 16 BRPM | HEART RATE: 80 BPM

## 2023-11-13 DIAGNOSIS — E55.9 HYPOVITAMINOSIS D: ICD-10-CM

## 2023-11-13 DIAGNOSIS — Z00.00 LABORATORY EXAM ORDERED AS PART OF ROUTINE GENERAL MEDICAL EXAMINATION: ICD-10-CM

## 2023-11-13 DIAGNOSIS — R42 VERTIGO: ICD-10-CM

## 2023-11-13 DIAGNOSIS — Z00.00 ENCOUNTER FOR ROUTINE ADULT MEDICAL EXAMINATION: Primary | ICD-10-CM

## 2023-11-13 PROCEDURE — 3074F SYST BP LT 130 MM HG: CPT | Performed by: INTERNAL MEDICINE

## 2023-11-13 PROCEDURE — 99395 PREV VISIT EST AGE 18-39: CPT | Performed by: INTERNAL MEDICINE

## 2023-11-13 PROCEDURE — 3079F DIAST BP 80-89 MM HG: CPT | Performed by: INTERNAL MEDICINE

## 2023-11-13 PROCEDURE — 3008F BODY MASS INDEX DOCD: CPT | Performed by: INTERNAL MEDICINE

## 2023-11-13 RX ORDER — MECLIZINE HCL 12.5 MG/1
12.5 TABLET ORAL 3 TIMES DAILY PRN
Qty: 10 TABLET | Refills: 0 | Status: SHIPPED | OUTPATIENT
Start: 2023-11-13

## 2023-11-13 NOTE — PATIENT INSTRUCTIONS
Increase water and plenty of sleep, meals on time etc, especially at the time of your period     Trial of anti-inflammatory at time of symptoms (aleve, advil)     Take meclizine as needed if vertigo symptoms are severe    Follow up with ENT if symptoms persist         Blood work

## 2023-11-30 ENCOUNTER — HOSPITAL ENCOUNTER (OUTPATIENT)
Age: 23
Discharge: HOME | End: 2023-11-30
Payer: COMMERCIAL

## 2023-11-30 VITALS
DIASTOLIC BLOOD PRESSURE: 86 MMHG | OXYGEN SATURATION: 100 % | BODY MASS INDEX: 39 KG/M2 | WEIGHT: 215 LBS | RESPIRATION RATE: 18 BRPM | SYSTOLIC BLOOD PRESSURE: 137 MMHG | HEART RATE: 80 BPM | TEMPERATURE: 99 F

## 2023-11-30 DIAGNOSIS — U07.1 COVID-19: Primary | ICD-10-CM

## 2023-11-30 LAB — SARS-COV-2 RNA RESP QL NAA+PROBE: DETECTED

## 2023-11-30 PROCEDURE — 99213 OFFICE O/P EST LOW 20 MIN: CPT

## 2023-11-30 NOTE — DISCHARGE INSTRUCTIONS
Rest and drink plenty of fluids. This will help to thin the mucous in the back of your throat. Take Tylenol and/or ibuprofen as needed for pain or fever. Use Zyrtec, Claritin, or Allegra to help with nasal drainage. You can also take Sudafed to help with sinus pressure or pain. Get the medication behind the pharmacy counter. Take Robitussin or Delsym as needed for cough. Follow up with your PCP in 5-7 days. Your symptoms should improve in the next 7-10 days; however, the cough can linger for much longer. Thank you for choosing [place] [id number] for your care.

## 2023-11-30 NOTE — ED INITIAL ASSESSMENT (HPI)
C/o fever for 2 days. Pt reports productive cough with green sputum, bilateral ear pressure, body aches, fatigue. Pt denies recent travel or anyone else sick. Pt reports otc meds used.

## 2024-01-29 ENCOUNTER — PATIENT MESSAGE (OUTPATIENT)
Dept: OBGYN CLINIC | Facility: CLINIC | Age: 24
End: 2024-01-29

## 2024-02-01 NOTE — TELEPHONE ENCOUNTER
Called and left a vm for pt. To call office to speak with nurse or make an appointment. To be evaluated for UTI symptoms...or to go to UC to also be evaluated.

## 2024-02-01 NOTE — TELEPHONE ENCOUNTER
From: Malka Crystal  To: Mari Garcia  Sent: 1/29/2024 12:40 AM CST  Subject: UTI treatment     Hi ,    I’m so sorry for sending this message. I have been experiencing some discomfort during sex and using the restroom. I took an at home UTI test and the results showed I could have one. I’m currently in school and don’t have any ability to go to the doctors until Thursday. Is there anything that you could send to the pharmacy to help with this or any OTC methods you recommend in the meanwhile until I can go to an immediate care?     Thank you in advance.    -Malka Crystal

## 2024-02-09 ENCOUNTER — LAB ENCOUNTER (OUTPATIENT)
Dept: LAB | Age: 24
End: 2024-02-09
Attending: INTERNAL MEDICINE
Payer: COMMERCIAL

## 2024-02-09 DIAGNOSIS — Z00.00 LABORATORY EXAM ORDERED AS PART OF ROUTINE GENERAL MEDICAL EXAMINATION: ICD-10-CM

## 2024-02-09 DIAGNOSIS — E55.9 HYPOVITAMINOSIS D: ICD-10-CM

## 2024-02-09 LAB
ALBUMIN SERPL-MCNC: 3.8 G/DL (ref 3.4–5)
ALBUMIN/GLOB SERPL: 0.9 {RATIO} (ref 1–2)
ALP LIVER SERPL-CCNC: 46 U/L
ALT SERPL-CCNC: 23 U/L
ANION GAP SERPL CALC-SCNC: 9 MMOL/L (ref 0–18)
AST SERPL-CCNC: 12 U/L (ref 15–37)
BASOPHILS # BLD AUTO: 0.05 X10(3) UL (ref 0–0.2)
BASOPHILS NFR BLD AUTO: 0.7 %
BILIRUB SERPL-MCNC: 0.6 MG/DL (ref 0.1–2)
BUN BLD-MCNC: 17 MG/DL (ref 9–23)
CALCIUM BLD-MCNC: 9.5 MG/DL (ref 8.5–10.1)
CHLORIDE SERPL-SCNC: 105 MMOL/L (ref 98–112)
CHOLEST SERPL-MCNC: 200 MG/DL (ref ?–200)
CO2 SERPL-SCNC: 23 MMOL/L (ref 21–32)
CREAT BLD-MCNC: 0.76 MG/DL
EGFRCR SERPLBLD CKD-EPI 2021: 113 ML/MIN/1.73M2 (ref 60–?)
EOSINOPHIL # BLD AUTO: 0.1 X10(3) UL (ref 0–0.7)
EOSINOPHIL NFR BLD AUTO: 1.3 %
ERYTHROCYTE [DISTWIDTH] IN BLOOD BY AUTOMATED COUNT: 12.1 %
EST. AVERAGE GLUCOSE BLD GHB EST-MCNC: 114 MG/DL (ref 68–126)
FASTING PATIENT LIPID ANSWER: YES
FASTING STATUS PATIENT QL REPORTED: YES
GLOBULIN PLAS-MCNC: 4.2 G/DL (ref 2.8–4.4)
GLUCOSE BLD-MCNC: 88 MG/DL (ref 70–99)
HBA1C MFR BLD: 5.6 % (ref ?–5.7)
HCT VFR BLD AUTO: 39.9 %
HDLC SERPL-MCNC: 68 MG/DL (ref 40–59)
HGB BLD-MCNC: 13.5 G/DL
IMM GRANULOCYTES # BLD AUTO: 0.02 X10(3) UL (ref 0–1)
IMM GRANULOCYTES NFR BLD: 0.3 %
LDLC SERPL CALC-MCNC: 108 MG/DL (ref ?–100)
LYMPHOCYTES # BLD AUTO: 1.76 X10(3) UL (ref 1–4)
LYMPHOCYTES NFR BLD AUTO: 23.4 %
MCH RBC QN AUTO: 31.4 PG (ref 26–34)
MCHC RBC AUTO-ENTMCNC: 33.8 G/DL (ref 31–37)
MCV RBC AUTO: 92.8 FL
MONOCYTES # BLD AUTO: 0.38 X10(3) UL (ref 0.1–1)
MONOCYTES NFR BLD AUTO: 5.1 %
NEUTROPHILS # BLD AUTO: 5.2 X10 (3) UL (ref 1.5–7.7)
NEUTROPHILS # BLD AUTO: 5.2 X10(3) UL (ref 1.5–7.7)
NEUTROPHILS NFR BLD AUTO: 69.2 %
NONHDLC SERPL-MCNC: 132 MG/DL (ref ?–130)
OSMOLALITY SERPL CALC.SUM OF ELEC: 285 MOSM/KG (ref 275–295)
PLATELET # BLD AUTO: 366 10(3)UL (ref 150–450)
POTASSIUM SERPL-SCNC: 4 MMOL/L (ref 3.5–5.1)
PROT SERPL-MCNC: 8 G/DL (ref 6.4–8.2)
RBC # BLD AUTO: 4.3 X10(6)UL
SODIUM SERPL-SCNC: 137 MMOL/L (ref 136–145)
TRIGL SERPL-MCNC: 137 MG/DL (ref 30–149)
TSI SER-ACNC: 2.07 MIU/ML (ref 0.36–3.74)
VIT D+METAB SERPL-MCNC: 14.2 NG/ML (ref 30–100)
VLDLC SERPL CALC-MCNC: 23 MG/DL (ref 0–30)
WBC # BLD AUTO: 7.5 X10(3) UL (ref 4–11)

## 2024-02-09 PROCEDURE — 83036 HEMOGLOBIN GLYCOSYLATED A1C: CPT

## 2024-02-09 PROCEDURE — 85025 COMPLETE CBC W/AUTO DIFF WBC: CPT

## 2024-02-09 PROCEDURE — 82306 VITAMIN D 25 HYDROXY: CPT

## 2024-02-09 PROCEDURE — 80053 COMPREHEN METABOLIC PANEL: CPT

## 2024-02-09 PROCEDURE — 80061 LIPID PANEL: CPT

## 2024-02-09 PROCEDURE — 84443 ASSAY THYROID STIM HORMONE: CPT

## 2024-02-12 RX ORDER — ERGOCALCIFEROL 1.25 MG/1
50000 CAPSULE ORAL WEEKLY
Qty: 12 CAPSULE | Refills: 0 | Status: SHIPPED | OUTPATIENT
Start: 2024-02-12 | End: 2024-05-12

## 2024-03-03 DIAGNOSIS — N93.9 ABNORMAL UTERINE BLEEDING (AUB): ICD-10-CM

## 2024-03-03 DIAGNOSIS — N91.1 SECONDARY AMENORRHEA: ICD-10-CM

## 2024-03-03 DIAGNOSIS — E28.2 PCOS (POLYCYSTIC OVARIAN SYNDROME): ICD-10-CM

## 2024-03-04 RX ORDER — NORETHINDRONE ACETATE AND ETHINYL ESTRADIOL AND FERROUS FUMARATE 1.5-30(21)
1 KIT ORAL DAILY
Qty: 84 TABLET | Refills: 1 | OUTPATIENT
Start: 2024-03-04

## 2024-03-04 NOTE — TELEPHONE ENCOUNTER
Last OV: 9/24/2022  Last refill date:   Medication Quantity Refills Start End   Norethin Ace-Eth Estrad-FE (BLISOVI FE 1.5/30) 1.5-30 MG-MCG Oral Tab 84 tablet 1 9/15/2023 --   Sig:   Take 1 tablet by mouth daily.       Follow-up: 1 year  Next appt.: 4/19/2024  Patient cancelled appointments on 12/12/23 and 2/8/24  Refill denied per protocol.  [facility] required for further refills.

## 2024-03-05 ENCOUNTER — TELEPHONE (OUTPATIENT)
Dept: OBGYN CLINIC | Facility: CLINIC | Age: 24
End: 2024-03-05

## 2024-03-05 NOTE — TELEPHONE ENCOUNTER
Please refill BC.  Future Appointments   Date Time Provider Department Center   3/15/2024 10:45 AM Mari Garcia MD [facility]

## 2024-03-05 NOTE — TELEPHONE ENCOUNTER
No additional refills until patient is seen for appointment.  Patient has already been notified by [facility] that refill can be given at her appointment.

## 2024-03-15 ENCOUNTER — OFFICE VISIT (OUTPATIENT)
Dept: OBGYN CLINIC | Facility: CLINIC | Age: 24
End: 2024-03-15
Payer: COMMERCIAL

## 2024-03-15 VITALS
BODY MASS INDEX: 42 KG/M2 | HEART RATE: 93 BPM | SYSTOLIC BLOOD PRESSURE: 122 MMHG | DIASTOLIC BLOOD PRESSURE: 82 MMHG | WEIGHT: 229.38 LBS

## 2024-03-15 DIAGNOSIS — Z01.419 WELL WOMAN EXAM WITH ROUTINE GYNECOLOGICAL EXAM: Primary | ICD-10-CM

## 2024-03-15 DIAGNOSIS — N91.1 SECONDARY AMENORRHEA: ICD-10-CM

## 2024-03-15 DIAGNOSIS — Z11.3 SCREENING EXAMINATION FOR STD (SEXUALLY TRANSMITTED DISEASE): ICD-10-CM

## 2024-03-15 DIAGNOSIS — E28.2 PCOS (POLYCYSTIC OVARIAN SYNDROME): ICD-10-CM

## 2024-03-15 DIAGNOSIS — N93.9 ABNORMAL UTERINE BLEEDING (AUB): ICD-10-CM

## 2024-03-15 DIAGNOSIS — Z12.4 SCREENING FOR CERVICAL CANCER: ICD-10-CM

## 2024-03-15 PROCEDURE — 3074F SYST BP LT 130 MM HG: CPT | Performed by: OBSTETRICS & GYNECOLOGY

## 2024-03-15 PROCEDURE — 3079F DIAST BP 80-89 MM HG: CPT | Performed by: OBSTETRICS & GYNECOLOGY

## 2024-03-15 PROCEDURE — 99395 PREV VISIT EST AGE 18-39: CPT | Performed by: OBSTETRICS & GYNECOLOGY

## 2024-03-15 PROCEDURE — 87624 HPV HI-RISK TYP POOLED RSLT: CPT | Performed by: OBSTETRICS & GYNECOLOGY

## 2024-03-15 PROCEDURE — 87491 CHLMYD TRACH DNA AMP PROBE: CPT | Performed by: OBSTETRICS & GYNECOLOGY

## 2024-03-15 PROCEDURE — 87591 N.GONORRHOEAE DNA AMP PROB: CPT | Performed by: OBSTETRICS & GYNECOLOGY

## 2024-03-15 RX ORDER — NORETHINDRONE ACETATE AND ETHINYL ESTRADIOL 1.5-30(21)
1 KIT ORAL DAILY
Qty: 84 TABLET | Refills: 3 | Status: SHIPPED | OUTPATIENT
Start: 2024-03-15

## 2024-03-15 NOTE — PROGRESS NOTES
[facility]  Obstetrics and Gynecology  History & Physical    CC: Patient presents for a well woman exam     Subjective:     HPI: Malka Crystal is a 23 year old  female here for a well women exam. Patient reports hx of PCOS and on OCP. Denies VTE/PE, migraines with aura and tobacco use. Would like to continue OCP. Missed OCP Rx this month. Regular menses with OCP.     OB History:  OB History    Para Term  AB Living   0 0 0 0 0 0   SAB IAB Ectopic Multiple Live Births   0 0 0 0 0       Gyne History:  Hx Prior Abnormal Pap: No  Pap Date: 22  Pap Result Notes: WNL  Patient's last menstrual period was 2024 (exact date).  NILM 10/2022  denies history of STDs (non-HPV).   Sexual history: Active? yes  Birth control? OCP    Meds:  Current Outpatient Medications on File Prior to Visit   Medication Sig Dispense Refill    sertraline 100 MG Oral Tab Take 1 tablet (100 mg total) by mouth nightly. 30 tablet 0    sertraline 50 MG Oral Tab TAKE 1 TABLET BY MOUTH DAILY AT BEDTIME WITH 100 MG TABLET 30 tablet 0    ergocalciferol 1.25 MG (20677 UT) Oral Cap Take 1 capsule (50,000 Units total) by mouth once a week. (Patient not taking: Reported on 3/15/2024) 12 capsule 0    meclizine 12.5 MG Oral Tab Take 1 tablet (12.5 mg total) by mouth 3 (three) times daily as needed. (Patient not taking: Reported on 3/15/2024) 10 tablet 0    [DISCONTINUED] Norethin Ace-Eth Estrad-FE (BLISOVI FE ) 1.5-30 MG-MCG Oral Tab Take 1 tablet by mouth daily. 84 tablet 1     No current facility-administered medications on file prior to visit.       All:  No Known Allergies    PMH:  Past Medical History:   Diagnosis Date    Depression     History of chicken pox     PCOS (polycystic ovarian syndrome)        Immunization History:   Immunization History   Administered Date(s) Administered    Covid-19 Vaccine Pfizer 30 mcg/0.3 ml 04/10/2021, 2021, 2021    DTAP 2000, 2001, 2001,  04/09/2002, 09/25/2006    HEP A,Ped/Adol,(2 Dose) 04/30/2012, 06/27/2013    HEP B, Ped/Adol 09/30/2000, 11/06/2000, 04/11/2001    HIB 12/11/2000, 02/09/2001, 04/11/2001, 04/09/2002    HPV (Gardasil) 08/08/2015    Hpv Virus Vaccine 9 Celi Im 12/28/2017    IPV 01/12/2001, 03/02/2001, 04/09/2002, 09/25/2006    MMR 01/08/2002, 09/25/2006    Meningococcal B, Omv 01/29/2020, 01/29/2020    Meningococcal-Menactra 04/30/2012, 12/28/2017    TDAP 04/30/2012, 08/01/2022    Varicella 11/06/2001       PSH:  History reviewed. No pertinent surgical history.    Social History:  Social History     Socioeconomic History    Marital status: Single     Spouse name: Not on file    Number of children: Not on file    Years of education: Not on file    Highest education level: Not on file   Occupational History    Not on file   Tobacco Use    Smoking status: Never    Smokeless tobacco: Never    Tobacco comments:     No passive smoke exposure   Vaping Use    Vaping Use: Never used   Substance and Sexual Activity    Alcohol use: No    Drug use: Yes     Types: Cannabis    Sexual activity: Yes     Partners: Male     Birth control/protection: OCP   Other Topics Concern     Service Not Asked    Blood Transfusions Not Asked    Caffeine Concern No    Occupational Exposure Not Asked    Hobby Hazards Not Asked    Sleep Concern Not Asked    Stress Concern Not Asked    Weight Concern Not Asked    Special Diet Not Asked    Back Care Not Asked    Exercise Yes     Comment: 3-4x weekly     Bike Helmet Not Asked    Seat Belt Yes    Self-Exams Not Asked   Social History Narrative    Not on file     Social Determinants of Health     Financial Resource Strain: Not on file   Food Insecurity: Not on file   Transportation Needs: Not on file   Physical Activity: Not on file   Stress: Not on file   Social Connections: Not on file   Housing Stability: Not on file         Patient feels unsafe or threatened?: denies    Abuse: denies physical, sexual or mental.      Family History:  Family History   Problem Relation Age of Onset    Heart Attack Father         age 54    Heart Disorder Mother         CHF in her 50s    Hypertension Maternal Grandmother     Diabetes Paternal Grandmother     Heart Disorder Paternal Grandmother     Diabetes Paternal Grandfather     Heart Disorder Paternal Grandfather     Other (PCOS) Sister        Health maintenance:  Mammogram (age 40 and q1-2yr): n/a  Colonoscopy (age 45 and q10yr): n/a    Review of Systems:  General: no complaints per category. See HPI for additional information.   Breast: no complaints per category. See HPI for additional information.   Respiratory: no complaints per category. See HPI for additional information.   Cardiovascular: no complaints per category. See HPI for additional information.   GI: no complaints per category. See HPI for additional information.   : no complaints per category. See HPI for additional information.   Heme: no complaints per category. See HPI for additional information.       Objective:     Vitals:    03/15/24 1105   BP: 122/82   Pulse: 93   Weight: 229 lb 6.4 oz (104.1 kg)         Body mass index is 41.96 kg/m².    General: AAO.NAD.   CVS exam: normal peripheral perfusion  Chest: non-labored breathing, no tachypnea   Breast:  symmetric, no dominant or suspicious mass, no skin or nipple changes and no axillary adenopathy  Abdominal exam:  soft, nontender, nondistended  Pelvic exam:   VULVA: normal appearing vulva with no masses, tenderness or lesions  PERINEUM: normal appearing, no lesions   URETHRAL MEATUS: normal appearing, no lesions   VAGINA: normal appearing vagina with normal color and discharge, no lesions  CERVIX: normal appearing cervix without discharge or lesions  UTERUS: uterus is normal size, shape, consistency and nontender  ADNEXA: normal adnexa in size, nontender and no masses  PERIRECTAL: normal appearing, no lesions   Ext: non-tender, no edema    Assessment:     Malka Crystal  is a 23 year old  female here for a well women exam.       Plan:       Problem List Items Addressed This Visit          Endocrine and Metabolic    PCOS (polycystic ovarian syndrome)    Relevant Medications    Norethin Ace-Eth Estrad-FE (BLISOVI FE 1.5/30) 1.5-30 MG-MCG Oral Tab       Genitourinary and Reproductive    Abnormal uterine bleeding (AUB)    Relevant Medications    Norethin Ace-Eth Estrad-FE (BLISOVI FE 1.5/30) 1.5-30 MG-MCG Oral Tab    Secondary amenorrhea    Relevant Medications    Norethin Ace-Eth Estrad-FE (BLISOVI FE 1.5/30) 1.5-30 MG-MCG Oral Tab     Other Visit Diagnoses       Well woman exam with routine gynecological exam    -  Primary    Relevant Medications    Norethin Ace-Eth Estrad-FE (BLISOVI FE 1.5/30) 1.5-30 MG-MCG Oral Tab    Screening examination for STD (sexually transmitted disease)        Relevant Orders    Chlamydia/Gc Amplification    Screening for cervical cancer        Relevant Medications    Norethin Ace-Eth Estrad-FE (BLISOVI FE 1.5/30) 1.5-30 MG-MCG Oral Tab    Other Relevant Orders    ThinPrep Pap with HPV Reflex, Chlamydia/GC            Cervical cancer screening  - discussion held with the patient about ASCCP guidelines  - repeat pap smear 10/2025  Health maintenance  - encouraged to maintain weight at healthy BMI  - discussed importance of exercise and healthy eating  - self breast exam instructions provided         Problem List Items Addressed This Visit          Endocrine and Metabolic    PCOS (polycystic ovarian syndrome)    Relevant Medications    Norethin Ace-Eth Estrad-FE (BLISOVI FE 1.5/30) 1.5-30 MG-MCG Oral Tab       Genitourinary and Reproductive    Abnormal uterine bleeding (AUB)    Relevant Medications    Norethin Ace-Eth Estrad-FE (BLISOVI FE 1.5/30) 1.5-30 MG-MCG Oral Tab    Secondary amenorrhea    Relevant Medications    Norethin Ace-Eth Estrad-FE (BLISOVI FE 1.5/30) 1.5-30 MG-MCG Oral Tab     Other Visit Diagnoses       Well woman exam with routine  gynecological exam    -  Primary    Relevant Medications    Norethin Ace-Eth Estrad-FE (BLISOVI FE 1.5/30) 1.5-30 MG-MCG Oral Tab    Screening examination for STD (sexually transmitted disease)        Relevant Orders    Chlamydia/Gc Amplification    Screening for cervical cancer        Relevant Medications    Norethin Ace-Eth Estrad-FE (BLISOVI FE 1.5/30) 1.5-30 MG-MCG Oral Tab    Other Relevant Orders    ThinPrep Pap with HPV Reflex, Chlamydia/GC                  All of the findings and plan were discussed with the patient.  She notes understanding and agrees with the plan of care.  All questions were answered to the best of my ability at this time.      RTC in 1 year for a well woman exam or sooner if needed     Mari Garcia MD   [facility] - OBGYN      Discussed with patient that there will not be further notification of normal or benign results other than receiving results on [employer]. A [employer] message or telephone call will be placed by the physician and/or office staff if results are abnormal.       Note to patient and family   The 21st Century Cures Act makes medical notes available to patients in the interest of transparency.  However, please be advised that this is a medical document.  It is intended as wwob-ka-jvzi communication.  It is written and medical language may contain abbreviations or verbiage that are technical and unfamiliar.  It may appear blunt or direct.  Medical documents are intended to carry relevant information, facts as evident, and the clinical opinion of the practitioner.      This note could include assistance by Dragon voice recognition. Errors in content may be related to improper recognition by the system; efforts to review and correct have been done but errors may still exist.

## 2024-03-18 LAB
C TRACH DNA SPEC QL NAA+PROBE: NEGATIVE
N GONORRHOEA DNA SPEC QL NAA+PROBE: NEGATIVE

## 2024-03-20 LAB
.: NORMAL
.: NORMAL

## 2024-03-21 LAB — HPV I/H RISK 1 DNA SPEC QL NAA+PROBE: NEGATIVE

## 2024-07-03 ENCOUNTER — HOSPITAL ENCOUNTER (OUTPATIENT)
Age: 24
Discharge: HOME | End: 2024-07-03
Payer: COMMERCIAL

## 2024-07-03 VITALS
TEMPERATURE: 98 F | HEIGHT: 62 IN | HEART RATE: 92 BPM | SYSTOLIC BLOOD PRESSURE: 122 MMHG | OXYGEN SATURATION: 96 % | BODY MASS INDEX: 46.01 KG/M2 | DIASTOLIC BLOOD PRESSURE: 61 MMHG | WEIGHT: 250 LBS | RESPIRATION RATE: 20 BRPM

## 2024-07-03 DIAGNOSIS — J01.00 ACUTE NON-RECURRENT MAXILLARY SINUSITIS: ICD-10-CM

## 2024-07-03 DIAGNOSIS — R05.9 COUGH: Primary | ICD-10-CM

## 2024-07-03 LAB — SARS-COV-2 RNA RESP QL NAA+PROBE: NOT DETECTED

## 2024-07-03 PROCEDURE — 99213 OFFICE O/P EST LOW 20 MIN: CPT | Performed by: NURSE PRACTITIONER

## 2024-07-03 PROCEDURE — U0002 COVID-19 LAB TEST NON-CDC: HCPCS | Performed by: NURSE PRACTITIONER

## 2024-07-03 RX ORDER — AMOXICILLIN AND CLAVULANATE POTASSIUM 875; 125 MG/1; MG/1
1 TABLET, FILM COATED ORAL 2 TIMES DAILY
Qty: 20 TABLET | Refills: 0 | Status: SHIPPED | OUTPATIENT
Start: 2024-07-03 | End: 2024-07-13

## 2024-07-03 NOTE — ED PROVIDER NOTES
Patient Seen in: [facility]      History     Chief Complaint   Patient presents with    Cough/URI     Stated Complaint: congestion /sinus pressure x 5 days    Subjective:   HPI    Pt is here today with c/o of congestion and sinus pressure for the 5 days.   Pt reports that on the 15-22nd was ill with the same symptoms.  Saturday started again with worsening symptoms.  + subjective fevers.      Objective:   Past Medical History:    Depression    History of chicken pox    PCOS (polycystic ovarian syndrome)              History reviewed. No pertinent surgical history.             Social History     Socioeconomic History    Marital status: Single   Tobacco Use    Smoking status: Never    Smokeless tobacco: Never    Tobacco comments:     No passive smoke exposure   Vaping Use    Vaping status: Never Used   Substance and Sexual Activity    Alcohol use: No    Drug use: Yes     Types: Cannabis    Sexual activity: Yes     Partners: Male     Birth control/protection: OCP   Other Topics Concern    Caffeine Concern No    Exercise Yes     Comment: 3-4x weekly     Seat Belt Yes              Review of Systems    Positive for stated Chief Complaint: Cough/URI    Other systems are as noted in HPI.  Constitutional and vital signs reviewed.      All other systems reviewed and negative except as noted above.    Physical Exam     ED Triage Vitals [07/03/24 0849]   /61   Pulse 92   Resp 20   Temp 97.8 °F (36.6 °C)   Temp src Temporal   SpO2 96 %   O2 Device None (Room air)       Current Vitals:   Vital Signs  BP: 122/61  Pulse: 92  Resp: 20  Temp: 97.8 °F (36.6 °C)  Temp src: Temporal    Oxygen Therapy  SpO2: 96 %  O2 Device: None (Room air)            Physical Exam    Adult physical exam:     VS: Vital signs reviewed. O2 saturation within normal limits for this patient     General: Patient is awake and alert, oriented to person, place and time. Not in acute distress.      HEENT: Head is normocephalic  atraumatic. Pupils reactive bilaterally.  EOMs intact.  Maillary sinus pain and pressure.  Tm's are wnl b ilaterall  No oral pallor. Mucous membranes moist.     Neck: No cervical lymphadenopathy. No stridor. Supple. No meningsmus.      Lungs: good inspiratory effort. +air entry bilaterally without wheezes, rhonchi, crackles.  No accessory muscle use or tachypnea.       Extremities: No edema.  Pulses 2+ extremities.   Brisk capillary refill noted.      Skin: Normal skin turgor     CNS: Moves all 4 extremities.  Interacts appropriately.  No tremor.  No gait abnormality        ED Course     Labs Reviewed   RAPID SARS-COV-2 BY PCR - Normal             I have personally  reviewed available prior medical records for any recent pertinent discharge summaries/testing. Patient/family updated on results and plan, a verbalized understanding and agreement with the plan.  I explained to the patient that emergent conditions may arise and to go to the ER for new, worsening or any persistent conditions. I've explained the importance of taking all medicatons as prescribed, follow up, and return precuations,  All questions answered.    Please note that this report has been produced using speech recognition software and may contain errors related to that system including, but not limited to, errors in grammar, punctuation, and spelling, as well as words and phrases that possibly may have been recognized inappropriately.  If there are any questions or concerns, contact the dictating provider for clarification.         MDM      Patient presents with a sinusitis for a covid test.  Patient reports that she started getting ill on 15 Janette, was ill all the way up until the 22nd, had a couple of days where she felt better and then started becoming ill again.  Nontoxic, does not meet SIRS criteria.   Patient does not have uvula deviation or unilateral tonsillar swelling to indicate tonsillar abscess. No meningsmus or trismus. No dysphagia or  difficulty handing secretions. No evidence for otitis media. Patient does not have any respiratory distress.  O2 saturation within normal limits for this patient. Does not appear clinically dehydrated and is tolerating oral intake. Presentation consistent with a sinusitis.  Patient treated with Augmentin.. Encouraged patient on oral hydration and supportive care. Recommend follow up with PCP.  Return to ED precautions discussed with the patient and family.                                   Medical Decision Making      Disposition and Plan     Clinical Impression:  1. Cough    2. Acute non-recurrent maxillary sinusitis         Disposition:  Discharge  7/3/2024  9:15 am    Follow-up:  Lea Green MD  [address] [place] [id number] [phone]                Medications Prescribed:  Discharge Medication List as of 7/3/2024  9:22 AM        START taking these medications    Details   amoxicillin clavulanate 875-125 MG Oral Tab Take 1 tablet by mouth 2 (two) times daily for 10 days., Normal, Disp-20 tablet, R-0

## 2024-08-20 ENCOUNTER — OFFICE VISIT (OUTPATIENT)
Dept: INTERNAL MEDICINE CLINIC | Facility: CLINIC | Age: 24
End: 2024-08-20
Payer: COMMERCIAL

## 2024-08-20 ENCOUNTER — LAB ENCOUNTER (OUTPATIENT)
Dept: LAB | Age: 24
End: 2024-08-20
Attending: INTERNAL MEDICINE
Payer: COMMERCIAL

## 2024-08-20 VITALS
RESPIRATION RATE: 16 BRPM | HEART RATE: 86 BPM | DIASTOLIC BLOOD PRESSURE: 75 MMHG | BODY MASS INDEX: 42.81 KG/M2 | TEMPERATURE: 98 F | HEIGHT: 62.25 IN | SYSTOLIC BLOOD PRESSURE: 115 MMHG | WEIGHT: 235.63 LBS

## 2024-08-20 DIAGNOSIS — R53.83 FATIGUE, UNSPECIFIED TYPE: Primary | ICD-10-CM

## 2024-08-20 DIAGNOSIS — R41.840 CONCENTRATION DEFICIT: ICD-10-CM

## 2024-08-20 DIAGNOSIS — G47.10 HYPERSOMNIA: ICD-10-CM

## 2024-08-20 DIAGNOSIS — E55.9 VITAMIN D DEFICIENCY: ICD-10-CM

## 2024-08-20 DIAGNOSIS — R73.03 PREDIABETES: ICD-10-CM

## 2024-08-20 DIAGNOSIS — R53.83 FATIGUE, UNSPECIFIED TYPE: ICD-10-CM

## 2024-08-20 LAB
ALBUMIN SERPL-MCNC: 4.9 G/DL (ref 3.2–4.8)
ALBUMIN/GLOB SERPL: 1.6 {RATIO} (ref 1–2)
ALP LIVER SERPL-CCNC: 54 U/L
ALT SERPL-CCNC: 53 U/L
ANION GAP SERPL CALC-SCNC: 7 MMOL/L (ref 0–18)
AST SERPL-CCNC: 38 U/L (ref ?–34)
BASOPHILS # BLD AUTO: 0.04 X10(3) UL (ref 0–0.2)
BASOPHILS NFR BLD AUTO: 0.6 %
BILIRUB SERPL-MCNC: 0.7 MG/DL (ref 0.3–1.2)
BUN BLD-MCNC: 13 MG/DL (ref 9–23)
CALCIUM BLD-MCNC: 10.4 MG/DL (ref 8.7–10.4)
CHLORIDE SERPL-SCNC: 99 MMOL/L (ref 98–112)
CO2 SERPL-SCNC: 28 MMOL/L (ref 21–32)
CREAT BLD-MCNC: 0.68 MG/DL
EGFRCR SERPLBLD CKD-EPI 2021: 125 ML/MIN/1.73M2 (ref 60–?)
EOSINOPHIL # BLD AUTO: 0.09 X10(3) UL (ref 0–0.7)
EOSINOPHIL NFR BLD AUTO: 1.2 %
ERYTHROCYTE [DISTWIDTH] IN BLOOD BY AUTOMATED COUNT: 11.7 %
EST. AVERAGE GLUCOSE BLD GHB EST-MCNC: 114 MG/DL (ref 68–126)
FASTING STATUS PATIENT QL REPORTED: YES
FOLATE SERPL-MCNC: 23.7 NG/ML (ref 5.4–?)
GLOBULIN PLAS-MCNC: 3.1 G/DL (ref 2–3.5)
GLUCOSE BLD-MCNC: 83 MG/DL (ref 70–99)
HBA1C MFR BLD: 5.6 % (ref ?–5.7)
HCT VFR BLD AUTO: 40.6 %
HGB BLD-MCNC: 14.1 G/DL
IMM GRANULOCYTES # BLD AUTO: 0.04 X10(3) UL (ref 0–1)
IMM GRANULOCYTES NFR BLD: 0.6 %
IRON SATN MFR SERPL: 35 %
IRON SERPL-MCNC: 126 UG/DL
LYMPHOCYTES # BLD AUTO: 1.49 X10(3) UL (ref 1–4)
LYMPHOCYTES NFR BLD AUTO: 20.6 %
MCH RBC QN AUTO: 31.4 PG (ref 26–34)
MCHC RBC AUTO-ENTMCNC: 34.7 G/DL (ref 31–37)
MCV RBC AUTO: 90.4 FL
MONOCYTES # BLD AUTO: 0.37 X10(3) UL (ref 0.1–1)
MONOCYTES NFR BLD AUTO: 5.1 %
NEUTROPHILS # BLD AUTO: 5.21 X10 (3) UL (ref 1.5–7.7)
NEUTROPHILS # BLD AUTO: 5.21 X10(3) UL (ref 1.5–7.7)
NEUTROPHILS NFR BLD AUTO: 71.9 %
OSMOLALITY SERPL CALC.SUM OF ELEC: 277 MOSM/KG (ref 275–295)
PLATELET # BLD AUTO: 343 10(3)UL (ref 150–450)
POTASSIUM SERPL-SCNC: 4 MMOL/L (ref 3.5–5.1)
PROT SERPL-MCNC: 8 G/DL (ref 5.7–8.2)
RBC # BLD AUTO: 4.49 X10(6)UL
SODIUM SERPL-SCNC: 134 MMOL/L (ref 136–145)
TOTAL IRON BINDING CAPACITY: 362 UG/DL (ref 250–425)
TRANSFERRIN SERPL-MCNC: 303 MG/DL (ref 250–380)
TSI SER-ACNC: 2.83 MIU/ML (ref 0.55–4.78)
VIT B12 SERPL-MCNC: 421 PG/ML (ref 211–911)
VIT D+METAB SERPL-MCNC: 19.2 NG/ML (ref 30–100)
WBC # BLD AUTO: 7.2 X10(3) UL (ref 4–11)

## 2024-08-20 PROCEDURE — 82306 VITAMIN D 25 HYDROXY: CPT | Performed by: INTERNAL MEDICINE

## 2024-08-20 PROCEDURE — 3078F DIAST BP <80 MM HG: CPT | Performed by: INTERNAL MEDICINE

## 2024-08-20 PROCEDURE — 83036 HEMOGLOBIN GLYCOSYLATED A1C: CPT | Performed by: INTERNAL MEDICINE

## 2024-08-20 PROCEDURE — 82746 ASSAY OF FOLIC ACID SERUM: CPT | Performed by: INTERNAL MEDICINE

## 2024-08-20 PROCEDURE — 82607 VITAMIN B-12: CPT | Performed by: INTERNAL MEDICINE

## 2024-08-20 PROCEDURE — 83550 IRON BINDING TEST: CPT | Performed by: INTERNAL MEDICINE

## 2024-08-20 PROCEDURE — 3008F BODY MASS INDEX DOCD: CPT | Performed by: INTERNAL MEDICINE

## 2024-08-20 PROCEDURE — 99214 OFFICE O/P EST MOD 30 MIN: CPT | Performed by: INTERNAL MEDICINE

## 2024-08-20 PROCEDURE — 83540 ASSAY OF IRON: CPT | Performed by: INTERNAL MEDICINE

## 2024-08-20 PROCEDURE — 3074F SYST BP LT 130 MM HG: CPT | Performed by: INTERNAL MEDICINE

## 2024-08-20 PROCEDURE — 80050 GENERAL HEALTH PANEL: CPT | Performed by: INTERNAL MEDICINE

## 2024-08-20 NOTE — PATIENT INSTRUCTIONS
- Get blood tests done today  - Schedule sleep study with the [facility]:  [address]  [address]  [place] [address]  [phone]    - Let you psychiatrist office know about your tiredness and concentration/focus symptoms to see if they want to add/change medications  - If sleep study and labs are normal can discuss further options with your psychiatrist or Dr. Green.    It was a pleasure seeing you in the clinic today.  Thank you for choosing the [facility] [place] office for your healthcare needs. Please call at [phone] with any questions or concerns.    Juliette Cruz MD

## 2024-08-20 NOTE — PROGRESS NOTES
Malka Crystal is a 23 year old female.   HPI:   Patient presents to discuss several issues.  Patient of Dr. Green.     She has been feeling more tired and fatigued of late.  Even if she sleeps throughout the night.  She is getting good sleep throughout the night.   She also notices increased issues with concentration/focus.      Other chronic issues:  Prediabetes - lifestyle controlled.  Vitamin D deficiency - not on supplementation.    Past medical, family, surgical and social history were reviewed as listed in the chart, and are unchanged from previous visit on Visit date not found  REVIEW OF SYSTEMS:   GENERAL/ const: fatigue/tiredness/hypersomnia; no fevers/chills, no unintentional weight loss  EYES:no vision problems  HEENT: denies sinus pain or sinus tenderness  LUNGS: denies shortness of breath   CARDIOVASCULAR: denies chest pain  GI: denies nausea/emesis/ abdominal pain diarrhea constipation  NEURO: denies headaches  PSYCHIATRIC: attention/concentration issues  ALLERGY: No Known Allergies  PAST HISTORY:     Current Outpatient Medications:     Norethin Ace-Eth Estrad-FE (BLISOVI FE 1.5/30) 1.5-30 MG-MCG Oral Tab, Take 1 tablet by mouth daily., Disp: 84 tablet, Rfl: 3    sertraline 100 MG Oral Tab, Take 1 tablet (100 mg total) by mouth nightly., Disp: 30 tablet, Rfl: 0    sertraline 50 MG Oral Tab, TAKE 1 TABLET BY MOUTH DAILY AT BEDTIME WITH 100 MG TABLET, Disp: 30 tablet, Rfl: 0  Medical:  has a past medical history of Depression, History of chicken pox, and PCOS (polycystic ovarian syndrome).  Surgical:  has no past surgical history on file.  Family: family history includes Diabetes in her paternal grandfather and paternal grandmother; Heart Attack in her father; Heart Disorder in her mother, paternal grandfather, and paternal grandmother; Hypertension in her maternal grandmother; PCOS in her sister.  Social:  reports that she has never smoked. She has never used smokeless tobacco. She reports current  alcohol use. She reports current drug use. Frequency: 1.00 time per week. Drug: Cannabis.  Wt Readings from Last 6 Encounters:   08/20/24 235 lb 9.6 oz (106.9 kg)   07/03/24 250 lb (113.4 kg)   03/15/24 229 lb 6.4 oz (104.1 kg)   11/30/23 215 lb (97.5 kg)   11/13/23 218 lb 3.2 oz (99 kg)   10/20/22 215 lb (97.5 kg)     EXAM:   /75 (BP Location: Left arm, Patient Position: Sitting, Cuff Size: adult)   Pulse 86   Temp 97.9 °F (36.6 °C) (Temporal)   Resp 16   Ht 5' 2.25\" (1.581 m)   Wt 235 lb 9.6 oz (106.9 kg)   LMP 07/28/2024 (Exact Date)   Breastfeeding No   BMI 42.75 kg/m²   GENERAL: Alert and oriented, well developed, well nourished,in no apparent distress  HEENT: atraumatic, PERRLA, EOMI, normal lid and conjunctiva  LUNGS: clear to auscultation bilaterally, no wheezing/rubs  CARDIO: RRR without murmurs.  No clubbing, cyanosis or edema.  GI: soft non tender nondistended no hepatosplenomegaly, bowel sounds throughout  NEURO: CN II-XII intact, 5/5 strength all extremities  PSYCH: pleasant, appropriate mood and affect  ASSESSMENT AND PLAN:   1. Fatigue, unspecified type  2. Hypersomnia  3. Concentration deficit  4. Prediabetes  5. Vitamin D deficiency  Patient has noticed increased fatigue/tiredness, feels sleepy even after sleeping all night.  Increased difficulty with concentration/focus.  She is concerned as she is going back to school in a couple of weeks.  She has noticed this more after taking her sertraline more regularly.  Could be medication side effect.  Will need to rule out sleep apnea - sleep study ordered.  Has prediabetes, vitamin D deficiency - this would not explain all symptoms but will check updated labs.  I also encouraged her to discuss her current symptoms with her psychiatrist to see if they recommend additional medications/change in medications.  - CBC With Differential With Platelet; Future  - Comp Metabolic Panel (14); Future  - TSH W Reflex To Free T4; Future  - Vitamin D;  Future  - Vitamin B12; Future  - Iron And Tibc; Future  - Folic Acid Serum (Folate); Future  - Diagnostic Sleep Study-split night PAP implemented if criteria met  - General sleep study; Future    Patient Care Team:  Lea Green MD as PCP - General (Internal Medicine)  Alice Islas APRN (Nurse Practitioner)  Wyatt Long RD (Dietitian)  Mari Garcia MD as Obstetrician (OBSTETRICS & GYNECOLOGY)  Val Peterson as Psychiatrist/APN (Nurse Practitioner)  The patient indicates understanding of these issues and agrees to the plan.  The patient is asked to return to clinic in 3-6 months with Dr. Green for follow up on chronic issues, or earlier if acute issues arise.    Juliette Cruz MD

## 2024-08-21 DIAGNOSIS — E55.9 VITAMIN D DEFICIENCY: Primary | ICD-10-CM

## 2024-08-21 RX ORDER — ERGOCALCIFEROL 1.25 MG/1
50000 CAPSULE, LIQUID FILLED ORAL WEEKLY
Qty: 12 CAPSULE | Refills: 0 | Status: SHIPPED | OUTPATIENT
Start: 2024-08-21

## 2024-09-24 ENCOUNTER — TELEPHONE (OUTPATIENT)
Dept: INTERNAL MEDICINE CLINIC | Facility: CLINIC | Age: 24
End: 2024-09-24

## 2024-09-24 NOTE — TELEPHONE ENCOUNTER
Denial letter for sleep study from [facility] placed in Dr. Cruz's in-box; appeal form attached.

## 2024-10-16 ENCOUNTER — HOSPITAL ENCOUNTER (OUTPATIENT)
Age: 24
Discharge: HOME | End: 2024-10-16
Payer: COMMERCIAL

## 2024-10-16 VITALS
SYSTOLIC BLOOD PRESSURE: 148 MMHG | TEMPERATURE: 98 F | WEIGHT: 235.69 LBS | RESPIRATION RATE: 20 BRPM | OXYGEN SATURATION: 97 % | HEART RATE: 94 BPM | BODY MASS INDEX: 41.76 KG/M2 | HEIGHT: 63 IN | DIASTOLIC BLOOD PRESSURE: 96 MMHG

## 2024-10-16 DIAGNOSIS — R05.1 ACUTE COUGH: ICD-10-CM

## 2024-10-16 DIAGNOSIS — J01.90 ACUTE SINUSITIS, RECURRENCE NOT SPECIFIED, UNSPECIFIED LOCATION: Primary | ICD-10-CM

## 2024-10-16 PROCEDURE — 99213 OFFICE O/P EST LOW 20 MIN: CPT | Performed by: PHYSICIAN ASSISTANT

## 2024-10-16 RX ORDER — BENZONATATE 100 MG/1
100 CAPSULE ORAL 3 TIMES DAILY PRN
Qty: 20 CAPSULE | Refills: 0 | Status: SHIPPED | OUTPATIENT
Start: 2024-10-16

## 2024-10-16 RX ORDER — ALBUTEROL SULFATE 90 UG/1
2 INHALANT RESPIRATORY (INHALATION) EVERY 4 HOURS PRN
Qty: 1 EACH | Refills: 0 | Status: SHIPPED | OUTPATIENT
Start: 2024-10-16 | End: 2024-11-15

## 2024-10-16 NOTE — ED INITIAL ASSESSMENT (HPI)
Pt c/o multiple symptoms starting on Sunday. Pt c//o moist cough, stuffy nose, body aches and post nasal drip. Pt denies fever.

## 2024-10-16 NOTE — ED PROVIDER NOTES
Patient Seen in: [facility]      History     Chief Complaint   Patient presents with    Cough/URI    Sore Throat    Stuffy Nose    Body ache and/or chills     Stated Complaint: congestion chest cough ear pain    Subjective:   HPI      Patient states approximately 2 weeks ago she had cold symptoms that did improve but never fully resolved.  Over the last 4 days she has had increased worsening of sinus pressure, postnasal drainage and cough.  States cough has been productive with yellow and green sputum.  Denies chest pain.  States that she has mild shortness of breath at times.  Denies fevers, chills, vomiting, diarrhea.  Denies any other complaints or concerns at this time.    Objective:     Past Medical History:    Depression    History of chicken pox    PCOS (polycystic ovarian syndrome)              History reviewed. No pertinent surgical history.             Social History     Socioeconomic History    Marital status: Single   Tobacco Use    Smoking status: Never    Smokeless tobacco: Never    Tobacco comments:     No passive smoke exposure   Vaping Use    Vaping status: Never Used   Substance and Sexual Activity    Alcohol use: Yes     Comment: Social    Drug use: Yes     Frequency: 1.0 times per week     Types: Cannabis    Sexual activity: Yes     Partners: Male     Birth control/protection: OCP   Other Topics Concern    Caffeine Concern No    Exercise No    Seat Belt Yes              Review of Systems    Positive for stated complaint: congestion chest cough ear pain  Other systems are as noted in HPI.  Constitutional and vital signs reviewed.      All other systems reviewed and negative except as noted above.    Physical Exam     ED Triage Vitals [10/16/24 0853]   BP (!) 148/96   Pulse 94   Resp 20   Temp 98 °F (36.7 °C)   Temp src Temporal   SpO2 97 %   O2 Device None (Room air)       Current Vitals:   Vital Signs  BP: (!) 148/96  Pulse: 94  Resp: 20  Temp: 98 °F (36.7 °C)  Temp src:  Temporal    Oxygen Therapy  SpO2: 97 %  O2 Device: None (Room air)        Physical Exam  Vitals and nursing note reviewed.   Constitutional:       Appearance: She is well-developed.   HENT:      Head: Normocephalic.      Right Ear: Tympanic membrane normal.      Left Ear: Tympanic membrane normal.      Nose:      Right Sinus: Maxillary sinus tenderness and frontal sinus tenderness present.      Left Sinus: Maxillary sinus tenderness and frontal sinus tenderness present.      Mouth/Throat:      Mouth: Mucous membranes are moist.      Pharynx: Uvula midline. Posterior oropharyngeal erythema present.      Comments: +PND  Eyes:      Conjunctiva/sclera: Conjunctivae normal.   Cardiovascular:      Rate and Rhythm: Normal rate and regular rhythm.      Heart sounds: Normal heart sounds.   Pulmonary:      Effort: Pulmonary effort is normal.      Breath sounds: Normal breath sounds.   Abdominal:      General: Bowel sounds are normal.      Palpations: Abdomen is soft.   Musculoskeletal:         General: Normal range of motion.      Cervical back: Normal range of motion and neck supple.   Skin:     General: Skin is warm and dry.   Neurological:      General: No focal deficit present.      Mental Status: She is alert.   Psychiatric:         Mood and Affect: Mood normal.             ED Course   Labs Reviewed - No data to display                MDM      Differential diagnosis includes but is not limited to COVID, influenza, URI, sinusitis, bronchitis, pneumonia.    Patient well-appearing, nontoxic.  Lungs CTA bilaterally.  Pulse ox is 97% on room air.  Patient is speaking in complete sentences and is in no respiratory distress.  Patient recently had URI 2 weeks ago and feels like symptoms never fully resolved and then 4 days ago her symptoms progressively worsened with increased sinus pressure, postnasal drainage and cough.  Discussed plan to treat with antibiotics at this time.  I advised continued supportive care at home,  follow-up and provided return precautions.  Patient verbalized understanding agreement plan.        Medical Decision Making  Risk  Prescription drug management.        Disposition and Plan     Clinical Impression:  1. Acute sinusitis, recurrence not specified, unspecified location    2. Acute cough         Disposition:  Discharge  10/16/2024  9:32 am    Follow-up:  Lea Green MD  [address] [place] [id number] [phone]    In 3 days            Medications Prescribed:  Discharge Medication List as of 10/16/2024  9:32 AM        START taking these medications    Details   amoxicillin clavulanate 875-125 MG Oral Tab Take 1 tablet by mouth 2 (two) times daily for 10 days., Normal, Disp-20 tablet, R-0      benzonatate 100 MG Oral Cap Take 1 capsule (100 mg total) by mouth 3 (three) times daily as needed for cough., Normal, Disp-20 capsule, R-0      albuterol 108 (90 Base) MCG/ACT Inhalation Aero Soln Inhale 2 puffs into the lungs every 4 (four) hours as needed for Wheezing., Normal, Disp-1 each, R-0                 Supplementary Documentation:

## 2024-10-16 NOTE — DISCHARGE INSTRUCTIONS
Take all antibiotics as instructed.  Go to ER for new/worsening symptoms (i.e. fevers, difficulty breathing, weakness, etc.)    While taking antibiotics it is recommended that you also take an over the counter probiotics.  If you'd like, you can have 2 servings of yogurt/Kefir daily instead.  Probiotics increase the good bacteria in your gut while the antibiotic fights the bad bacteria that is causing your infection.  The good bacteria in your gut act as part of your immune system.  Taking a probiotic while on antibiotics will decrease the chances of upset stomach and diarrhea, which are common side effects of antibiotics.

## 2025-05-19 ENCOUNTER — OFFICE VISIT (OUTPATIENT)
Dept: INTERNAL MEDICINE CLINIC | Facility: CLINIC | Age: 25
End: 2025-05-19
Payer: COMMERCIAL

## 2025-05-19 ENCOUNTER — PATIENT MESSAGE (OUTPATIENT)
Dept: INTERNAL MEDICINE CLINIC | Facility: CLINIC | Age: 25
End: 2025-05-19

## 2025-05-19 VITALS
HEART RATE: 76 BPM | SYSTOLIC BLOOD PRESSURE: 112 MMHG | DIASTOLIC BLOOD PRESSURE: 72 MMHG | HEIGHT: 62 IN | BODY MASS INDEX: 45.27 KG/M2 | RESPIRATION RATE: 16 BRPM | WEIGHT: 246 LBS

## 2025-05-19 DIAGNOSIS — E55.9 VITAMIN D DEFICIENCY: ICD-10-CM

## 2025-05-19 DIAGNOSIS — R73.03 PREDIABETES: ICD-10-CM

## 2025-05-19 DIAGNOSIS — E88.819 INSULIN RESISTANCE: ICD-10-CM

## 2025-05-19 DIAGNOSIS — E78.5 DYSLIPIDEMIA: ICD-10-CM

## 2025-05-19 DIAGNOSIS — E28.2 PCOS (POLYCYSTIC OVARIAN SYNDROME): ICD-10-CM

## 2025-05-19 DIAGNOSIS — Z51.81 ENCOUNTER FOR THERAPEUTIC DRUG MONITORING: ICD-10-CM

## 2025-05-19 DIAGNOSIS — K76.0 FATTY LIVER: Primary | ICD-10-CM

## 2025-05-19 PROBLEM — F41.1 GENERALIZED ANXIETY DISORDER: Status: ACTIVE | Noted: 2021-11-03

## 2025-05-19 PROBLEM — T14.91XA SUICIDE ATTEMPT (HCC): Status: ACTIVE | Noted: 2021-11-02

## 2025-05-19 PROCEDURE — 3074F SYST BP LT 130 MM HG: CPT | Performed by: NURSE PRACTITIONER

## 2025-05-19 PROCEDURE — 3078F DIAST BP <80 MM HG: CPT | Performed by: NURSE PRACTITIONER

## 2025-05-19 PROCEDURE — 99214 OFFICE O/P EST MOD 30 MIN: CPT | Performed by: NURSE PRACTITIONER

## 2025-05-19 PROCEDURE — 3008F BODY MASS INDEX DOCD: CPT | Performed by: NURSE PRACTITIONER

## 2025-05-19 RX ORDER — PHENTERMINE HYDROCHLORIDE 15 MG/1
15 CAPSULE ORAL EVERY MORNING
Qty: 30 CAPSULE | Refills: 2 | Status: SHIPPED | OUTPATIENT
Start: 2025-05-19

## 2025-05-19 RX ORDER — METFORMIN HYDROCHLORIDE 500 MG/1
500 TABLET, EXTENDED RELEASE ORAL DAILY
Qty: 90 TABLET | Refills: 2 | Status: SHIPPED | OUTPATIENT
Start: 2025-05-19

## 2025-05-19 NOTE — PROGRESS NOTES
[address], [address] [place] [id number]  Dept: [phone]       Date of Consult:  2025    Patient:  Malka Crystal  :      2000  MRN:      [id number]    Referring Provider: Seen here in the past    Chief Complaint:    Chief Complaint   Patient presents with    Weight Problem     Pt is reestablishing with Lisa, pt is interested in taking wt lose medications.     SUBJECTIVE     History of Present Illness:  Malka Crystal is a new patient and has been referred to me for weight loss and weight management recommendations and evaluation and treatment.       Patient is considering medications; no to bariatric surgery for weight loss.  Patient has history of eating disorder(s) - binging and purging for one year, back in high school.   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  Patient lives with boyfriend.  On a scale of 1 to 10, patient's desire to lose weight is a 10.  On a scale of 1 to 10, patient's belief that she can lose weight is a 8. Does have gym anxiety.     Patient's goal weight: 200lb  Biggest weight loss in the past: 15 lb  How weight loss was achieved: diet and exercise  Heaviest weight ever: 246lbs  Previous use of medical weight loss medications: Metformin, Topiramate  Barriers to weight loss: PCOS    Physical activity: nothing routine  - gym membership - [employer] - x3/wk - 20min inclined walking on treadmill with arm swings    Stress level: minimum - not in school, off summer  Sleep: 6-8 hours/night    Sleep screening: + snore, has a sleep study referral but states insurance didn't approve    Past Medical History: Past Medical History[1]    OBJECTIVE     Vitals: /72   Pulse 76   Resp 16   Ht 5' 2\" (1.575 m)   Wt 246 lb (111.6 kg)   LMP 2025 (Exact Date)   BMI 44.99 kg/m²      Wt Readings from Last 6 Encounters:   25 246 lb (111.6 kg)   10/16/24 235 lb 10.8 oz  (106.9 kg)   08/20/24 235 lb 9.6 oz (106.9 kg)   07/03/24 250 lb (113.4 kg)   03/15/24 229 lb 6.4 oz (104.1 kg)   11/30/23 215 lb (97.5 kg)      Patient Medications:  Current Medications[2]  Allergies:  Patient has no known allergies.   Comorbidities:  prediabetes and insulin resistance and PCOS  Social History:  Reviewed  Surgical History:  Past Surgical History[3]  Family History:  Family History[4]    Typical Dietary Intake:  Breakfast AM Snack Lunch PM Snack Dinner   Veggie scramble   Corn tortilla (4)  skips  Rice with corn and 2 pieces of roasted chicken thigh and avocado      After dinner behavior: -  Night eating: -  Portion sizes: sometimes trouble with portion sizes  Binge: -  Emotional: +  Depression: +  Grazing: -  Cravings:  Yes  Sweet tooth: -  Crunchy/salty: +    Drinks: water  Etoh: 0  Soda Drinker: No    Sports Drinks:  No    Juice:  No      Number of restaurant or fast food meals/week:  1-2 meals/week    Nutritional Goals Reviewed and Discussed:     Calorie-controlled diet: 1683, Limit carbohydrates to 147 gms per day, Eat 100-200 calories within 1 hour of waking up, and Eat 3-4 cups of fresh fruit or vegetables daily. Protein: 126g    Behavior Modifications Reviewed and Discussed:    Eat breakfast, Eat 3 meals per day, Plan meals in advance, Read nutrition labels, Drink 64oz of water per day, Maintain a daily food journal, No drinking 30 minutes before or after meals, Utilize portion control strategies to reduce calorie intake, Identify triggers for eating and manage cues, and Eat slowly and take 20 to 30 minutes to complete each meal    BODY SCAN results:   Current body weight: 246.8lbs  Total body fat: 46.3%  Visceral fat: 12  Muscle Mass: 29.5lbs  Total body water: 41.3%     ROS:  Review of Systems   Constitutional:  Positive for fatigue.   All other systems reviewed and are negative.     Physical Exam:  Physical Exam  Vitals reviewed.   Constitutional:       Appearance: Normal appearance.    HENT:      Head: Normocephalic and atraumatic.   Pulmonary:      Effort: Pulmonary effort is normal.   Neurological:      General: No focal deficit present.      Mental Status: She is alert and oriented to person, place, and time. Mental status is at baseline.   Psychiatric:         Mood and Affect: Mood normal.         Behavior: Behavior normal.        ASSESSMENT   Encounter Diagnosis(ses):   1. Fatty liver    2. Encounter for therapeutic drug monitoring    3. PCOS (polycystic ovarian syndrome)    4. Vitamin D deficiency    5. Prediabetes    6. Dyslipidemia    7. Insulin resistance      PLAN     Diagnoses and all orders for this visit:    Fatty liver  -     CBC With Differential With Platelet; Future  -     Comp Metabolic Panel (14); Future  -     B12 AND FOLATE; Future  -     Hemoglobin A1C; Future  -     Lipid Panel; Future  -     TSH and Free T4; Future  -     Vitamin D; Future  -     Insulin [E]; Future  -     OP REFERRAL TO [facility] ([facility] USE ONLY)  -     OP REFERRAL [facility]    Encounter for therapeutic drug monitoring  -     CBC With Differential With Platelet; Future  -     Comp Metabolic Panel (14); Future  -     B12 AND FOLATE; Future  -     Hemoglobin A1C; Future  -     Lipid Panel; Future  -     TSH and Free T4; Future  -     Vitamin D; Future  -     Insulin [E]; Future  -     OP REFERRAL TO [facility] ([facility] USE ONLY)  -     OP REFERRAL [facility]    PCOS (polycystic ovarian syndrome)  Comments:  had US in 2021 to confirm  Orders:  -     CBC With Differential With Platelet; Future  -     Comp Metabolic Panel (14); Future  -     B12 AND FOLATE; Future  -     Hemoglobin A1C; Future  -     Lipid Panel; Future  -     TSH and Free T4; Future  -     Vitamin D; Future  -     Insulin [E]; Future  -     OP REFERRAL TO [facility] ([facility] USE ONLY)  -     OP REFERRAL [facility]    Vitamin D deficiency  -     CBC With Differential With Platelet; Future  -     Comp  Metabolic Panel (14); Future  -     B12 AND FOLATE; Future  -     Hemoglobin A1C; Future  -     Lipid Panel; Future  -     TSH and Free T4; Future  -     Vitamin D; Future  -     Insulin [E]; Future  -     OP REFERRAL TO [facility] ([facility] USE ONLY)  -     OP REFERRAL [facility]    Prediabetes  Comments:  hemoglobin a1c - 5.8% - in   Orders:  -     CBC With Differential With Platelet; Future  -     Comp Metabolic Panel (14); Future  -     B12 AND FOLATE; Future  -     Hemoglobin A1C; Future  -     Lipid Panel; Future  -     TSH and Free T4; Future  -     Vitamin D; Future  -     Insulin [E]; Future  -     OP REFERRAL TO [facility] ([facility] USE ONLY)  -     OP REFERRAL [facility]    Dyslipidemia  -     CBC With Differential With Platelet; Future  -     Comp Metabolic Panel (14); Future  -     B12 AND FOLATE; Future  -     Hemoglobin A1C; Future  -     Lipid Panel; Future  -     TSH and Free T4; Future  -     Vitamin D; Future  -     Insulin [E]; Future  -     OP REFERRAL TO [facility] ([facility] USE ONLY)  -     OP REFERRAL [facility]    Insulin resistance  -     CBC With Differential With Platelet; Future  -     Comp Metabolic Panel (14); Future  -     B12 AND FOLATE; Future  -     Hemoglobin A1C; Future  -     Lipid Panel; Future  -     TSH and Free T4; Future  -     Vitamin D; Future  -     Insulin [E]; Future  -     OP REFERRAL TO [facility] ([facility] USE ONLY)  -     OP REFERRAL [facility]    Reviewed labs. Will order new set of labs today.  Last A1c value was 5.6% done 2024.  Cholesterol: 200, done on 2024.  HDL Cholesterol: 68, done on 2024.  TriGlycerides 137, done on 2024.  LDL Cholesterol: 108, done on 2024.   Lab Results   Component Value Date    T4F 0.9 2021    TSH 2.835 2024      Plan for b12, fasting insulin level, and vitamin d. Will get new labs today.  EK - will need new one if pt starts a  stimulant    OBESITY/WEIGHT GAIN PLAN:  Medication Management:  Pt is going to call her insurance to see what medication is covered. If pt has GLP-1 medication coverage, will order Wegovy or Zepbound.     If pt doesn't have GLP-1 medication coverage, then will likely prescribe Metformin for the PCOS and Phentermine short-term and possibly switch then pt to Contrave long-term. Pt in agreement with plan.    Discussed:  - Recommended intensive lifestyle and behavioral modifications for weight loss.    - Discussed importance of building muscle, maintaining muscle mass, and increasing PO protein   - Educated patient on lifestyle modifications: Mediterranean/Whole Food/Plant Strong/Low Glycemic Index diet, moderate alcohol consumption, reduced sodium intake to no more than 2,400 mg/day, and at least 150-300 minutes of moderate physical activity per week. Stress can create barrier to weight loss and exercise is a great way to reduce stress.  - Discussed the importance of whole food, plant based, minimally to non-processed diet rich in fruits, vegetables, whole grains and healthy fats, and meats/seafood without hormones, steroids, or antibiotics. Avoid processed, poor quality carbohydrates, refined grains, flour, sugar. Increase protein intake and don't skip meals.  Goals for next month:  1. Keep a food log, aim for 100 grams carbs/day. Meet with RD.   2. Drink 64 ounces of non-caloric beverages per day. No fruit juices or regular soda.  3. Aim for 150 minutes moderate exercise per week.   - pt specific: x3/wk x20min inclined walking  4. Increase fruit and vegetable servings to 5-6 per day.    5. Improve sleep and stress, both VERY important in weight loss and management.    Discussed medication options for weight loss in detail with patient, including oral and injectable agents.   Discussed therapy options and referrals available, pt declined at this time.   - pt has own therapist and psychiatrist   Discussed dietician  consultation referral and appointments for one-on-one meal prepping/planning, pt accepted referral at this time.   - pt will check with insurance and then get scheduled  Discussed fitness consultation/memberships referrals with pt, pt accepted referral at this time.    Denies personal or family hx medullary thyroid CA, endocrine neoplasia syndrome, pancreatitis hx, suicidal ideation. No renal impairment, severe GI disease, diabetes, pancreatitis risks noted.     I spent 60 minutes of face to face time with patient, 30 minutes of which were spent on nutrition, exercise, sleep, stress, weight loss/behavioral counseling and care coordination.    Follow-up: 4 week telehealth, 3 month in-person, 6 month in-person    Lisa Johnson, CONNOR, FNP-BC        [1]   Past Medical History:   Depression    History of chicken pox    PCOS (polycystic ovarian syndrome)   [2]   Current Outpatient Medications   Medication Sig Dispense Refill    sertraline 100 MG Oral Tab Take 1 tablet (100 mg total) by mouth nightly. 30 tablet 0    sertraline 50 MG Oral Tab TAKE 1 TABLET BY MOUTH DAILY AT BEDTIME WITH 100 MG TABLET 30 tablet 0   [3] History reviewed. No pertinent surgical history.  [4]   Family History  Problem Relation Age of Onset    Heart Attack Father         age 54    Heart Disorder Mother         CHF in her 50s    Hypertension Maternal Grandmother     Diabetes Paternal Grandmother     Heart Disorder Paternal Grandmother     Diabetes Paternal Grandfather     Heart Disorder Paternal Grandfather     Other (PCOS) Sister

## 2025-05-19 NOTE — PATIENT INSTRUCTIONS
Welcome to the [facility]!!    I can't wait to keep working with you on this journey that is life. Always unpredictable and curveballs are no doubt to happen, but I hope you know I'm here to help and guide you along the way.  No judgment zone remember!  Ok...  Next steps:  1.  Schedule a personal nutrition consultation with one of our registered dieticians. Bring along your food journal (3 days minimum). See journal options below.     2.  Call our office at [phone] to schedule future follow-up visits: 4 week telehealth, 3 month in-person, 6 month follow-up in person (this is policy to be seen in clinic every 6 months for medication refills).    3.  Call your insurance to see what medication is covered. If you have GLP-1 medication coverage, will order Wegovy or Zepbound. Will have to meet every so often to discuss tolerance and titration of the dosing.    If you don't have GLP-1 medication coverage, then will likely prescribe Metformin for the PCOS and Phentermine short-term and possibly switch then pt to Contrave long-term.     Message me after you get ahold of them.    4. Look out for email from fitness center and respond! Get that scheduled!      Try to work on the following dietary changes this first month, tailored to your height and weight specifically, and no need to be perfect:  Daily protein recommendation to start:  126 grams  Daily carbohydrate: 147g  Daily calories: 1683    1.  Drink water with meals and throughout the day, cut down on soda and/or juice if consumed. Consider flavored water options like Bubbly, Spindrift, Hint and Estefani. Or add fresh lemon, lime, cinnamon sticks, cucumber, orange to your water!  2.  Eat breakfast daily and focus on having protein with each meal, examples include: greek yogurt, cottage cheese, hard boiled egg, whole grain toast with peanut butter/almond butter/avocado.   3.  Reduce refined carbohydrates and sugars which includes items  such as sweets, as well as rice, pasta, and bread. Make sure to choose whole grain options when having them with just 1 serving per meal about the size of your inner palm. I always say, try to reduce how much you eat that comes from a bag. Yes, including chips and crackers and cookies.  4.  Consume non starchy veggies daily working towards making them a good 50% of your daily food intake. Add them to lunch and dinner consistently.  5.  Optional can start a daily probiotic: VSL#3, (order on line at www.vsl3.com). Take 1 capsule daily with water for 30 days, then reduce to 1 every other day (this will reduce the cost). Capsules can be left out for 2 weeks, but then must be refrigerated.      Please download blue My Fitness Pal, LoseIt! Or MyNet Diary to monitor daily dietary intake and you will be able to see if you are eating the right amount of calories or too much or too little which would hinder weight loss. Additionally this will help to see your daily carbohydrate and protein intake. When you set the blue up choose 1-2 lbs/week as a goal.  Keeping a paper food journal is an option as well to remain accountable for your choices- this is the start to mindful eating! A low calorie diet has been consistently shown to support weight loss.     Continue or start exercising to help establish a routine. If not already exercising begin with 1 day and progress as able with long-term goal of 30 minutes 5 days a week at a minimum. I like to say 20 min of moving your body 3 times a week and build from there. Find something you like! [employer] has free workouts and range in time frame!      Meditation and exercise daily can help manage and control stress and lead to better sleep. Chronic stress and interrupted sleep can make weight loss very difficult.  Exercising is one way to help with stress, but meditation using the CALM Blue or another comparable alternative can be done in your home or place of work with little time  commitment. This Blue can also help work on behavior change and improve sleep. Check out the segment under Calm Masterclass and listen to The 4 Pillars of Health. A great way to begin learning about the foundation of lifestyle with practical tips to use in your every day.     Check out www.yourweightmatters.org blog for continued daily support and education along this weight loss journey!    Patient Resources:     Personal Training/Fitness Classes/Health Coaching     [facility] @ [email or web address] Full fitness center with group fitness and personal training. Discount available as client of [facility].  Health Coaching and Personal Training with Jing Gray at our [facility]- individual weekly coaching with option to add personal training and small group fitness classes targeted at weight loss- [phone] and/or email @ [email or web address]  [employer] [place] [email or web address] Group Fitness [phone] and/or email [email or web address]  [facility]itness Centers with multiple locations: THE MELT (www.Andre Phillipe), [employer] The [place] (www.Sustainatopia.com), [employer] (www.Withings), The Exercise  (www.exercisecoach.edenes)     Online Fitness  Fitness  on Greenpieube  Sit and Be Fit - Chair exercise series Www.sitandbefit.org     Apps for on the Go Fitness  [employer] 7 Minute Workout (orange box with white 7) - free on the go HIIT training blue  Peloton Blue @ www.onepeloton.com - I LOVE this blue - and annual membership is like $200 you don't even need a pelBoken equipment, but they have 5min, 10min, 15min, etc up to 60min workouts and it feels like you are one-on-one with a  - they have multiple trainers and levels of their program! they have bodyweight sessions, variations in times of classes, great motivator to have  someone doing their workout with you!     Nutrition Trackers and Tools  LoseIT! And My Fitness Pal apps and Stirling Ultracold([employer]ing)  NOOM - virtual health coaching  FitFoundation (healthy meals on the go) in [place] @ www.[email or web address]  Jacy WEINSTEIN @ www.bistromd.com and Goxiew16 (keto and low carb plans recommended) @ www.bghpdm47.com, Metabolic Meals @ www.apiOmatMetabolicMeals.com - individual prepared meals to go  Gobble, Blue Apron, Home , Every Plate, Sunbasket- on line meal delivery programs for preparation at home  Meal Village in [place] for homemade meals to go @ www.mealPrioria [email or web address].NVELO  Diet Doctor @ www.dietdoctor.com - low carb swaps  [employer]umm[employer] - meal prep and planning valerio (www.yummly.com)     Stress Management/Behavior/Mindful Eating  CALM meditation valerio (www.calm.com)  Headspace  Am I Hungry? Mindful eating virtual  valerio  Www.yourweightmatters.org - Obesity Action Coalition sponsored Blog posts daily  Motivation valerio (black box with white \")- daily supportive messages sent to your phone  Aura - pretty cheap, like $70 for the year subscription - as short as 4min meditation or as long as you want, has nighttime calming music for sleep too!     Books/Video Education/Podcasts  Mindless Eating by Marc Hadley  Why We Get Sick by Mitul Dempsey (a book about insulin resistance)  Atomic Habits by Kiko Lim (a book about taking small steps to promote greater behavior change)   Can't Hurt Me by Corby Carrasquillo (a book exploring the power of discipline in achieving your goals)  Your Body in Balance: The New Science of Food, Hormones, and Health by Dr. Tavares Couch  The Menopause Diet Plan by Arabella Combs and Nannette Diaz  The Complete Guide to fasting by Dr. Angeles  Sugar, Salt & Fat by Jessica Walker, Ph.D, R.D.  Weight Loss Surgery Will Not Treat Food Addiction by Disha Gutiérrez Ph.D  The Game Changers- [employer] Documentary on plant based nutrition  Fed Up - documentary about obesity (Free on Utube)  The Truth About  Sugar - documentary on sugar (Free on Utube, https://youtu.be/5P1idxkSN1u)  The Dr. Rivera T5 Wellness Plan by Dr. Flaquito Rivera MD  Fitlosophy Fitspiration - journal to better health (found at [employer] in fitness aisle)  What Happened to You?- a look at the impact trauma has on behavior written by Mae Encinas and Dr. Jaden Gillespie  Whole Again by Dale Land - discovering your true self after trauma  Bridget Martinez talk on Meiyou, The Call to Rollerscoot  Podcasts: The Exam Room by the Physician's Committee, Nutrition Facts by Dr. Davi Jenkins and Landmines by Jose Carlos Martinez - A diabetes guide  Why Zebras don't get ulcers - book on the effects of chronic stress!    We are here to support you with weight loss, but please remember that you still need your primary care provider for your routine health maintenance.

## 2025-06-09 ENCOUNTER — HOSPITAL ENCOUNTER (OUTPATIENT)
Age: 25
Discharge: HOME | End: 2025-06-09
Payer: COMMERCIAL

## 2025-06-09 VITALS
DIASTOLIC BLOOD PRESSURE: 87 MMHG | RESPIRATION RATE: 24 BRPM | TEMPERATURE: 99 F | OXYGEN SATURATION: 98 % | SYSTOLIC BLOOD PRESSURE: 140 MMHG | HEART RATE: 91 BPM

## 2025-06-09 DIAGNOSIS — R05.9 COUGH: Primary | ICD-10-CM

## 2025-06-09 DIAGNOSIS — J06.9 VIRAL UPPER RESPIRATORY TRACT INFECTION: ICD-10-CM

## 2025-06-09 LAB
POCT INFLUENZA A: NEGATIVE
POCT INFLUENZA B: NEGATIVE
SARS-COV-2 RNA RESP QL NAA+PROBE: NOT DETECTED

## 2025-06-09 PROCEDURE — 99213 OFFICE O/P EST LOW 20 MIN: CPT

## 2025-06-09 PROCEDURE — 87502 INFLUENZA DNA AMP PROBE: CPT

## 2025-06-09 PROCEDURE — U0002 COVID-19 LAB TEST NON-CDC: HCPCS

## 2025-06-09 NOTE — ED INITIAL ASSESSMENT (HPI)
Pt began 6/7 with a sore throat and body aches.  It has progressed into congestion and cough and her lungs and throat feel a little tight

## 2025-06-09 NOTE — DISCHARGE INSTRUCTIONS
Advised on ibuprofen and acetaminophen for fevers, chills, pain, sore throat, body aches, and headaches  To reduce congestion use a cool mist humidifier or vaporizer, and breathe in steam from a warm shower.   Recommended Mucinex (guaifenesin) or Robitussin (dextromethorphan and guaifenesin) for congestion and cough. One teaspoon of honey three times a day for cough  Can use Vicks on chest at night  Prop yourself up with a few pillows at night to help you breathe comfortably during sleep.    Drink plenty of fluids to stay well hydrated, and get lots of rest. This helps loosen up mucus    Try a saline nasal spray OTC twice daily to facilitate mucus drainage and keep nares moist. Perform this step first before using any steroid/medicated nasal sprays such as flonase. Flonase is to be used OTC 1-2 sprays in each nostril once daily. Proper use of nasal sprays includes blowing your nose before administration of nasal spray. Flonase can dry out your nose.   Discussed use of pseudoephedrine and oxymetazoline (Afrin) for relief of nasal congestion. Do not use for more than 3 days or else it can cause rebound congestion. Do not use with history of hypertension  Can use OTC allergy medications such as claritin, zyrtec, or allegra to help dry out secretions.   Use of elderberry, vitamin C, and Zinc for immune support  Educated patient on salt water gargles and use of cepacol for sore throat

## 2025-06-10 NOTE — ED PROVIDER NOTES
History     Chief Complaint   Patient presents with    Cough/URI       Subjective:   HPI    Malka Crystal, 24 year old female with notable medical history of pre-DM, PCOS, depression, allergic rhinitis who presents with cough, PND, sore throat and nasal congestion. Reports body aches, but had not taken temperature. Tried nyquil. Denies any SOB or chest pain.        Problem List[1]   Objective:   Past Medical History:    Depression    History of chicken pox    PCOS (polycystic ovarian syndrome)              History reviewed. No pertinent surgical history.             Social History     Socioeconomic History    Marital status: Single   Tobacco Use    Smoking status: Never    Smokeless tobacco: Never    Tobacco comments:     No passive smoke exposure   Vaping Use    Vaping status: Never Used   Substance and Sexual Activity    Alcohol use: Yes     Comment: Social    Drug use: Yes     Frequency: 1.0 times per week     Types: Cannabis    Sexual activity: Yes     Partners: Male     Birth control/protection: OCP   Other Topics Concern    Caffeine Concern No    Exercise No    Seat Belt Yes              Medications Ordered Prior to Encounter[2]      Constitutional and vital signs reviewed.      All other systems reviewed and negative except as noted above.    I have reviewed the family history, social history, allergies, and outpatient medications.     History reviewed from EMR: Encounters, problem list, allergies, medications      Physical Exam     ED Triage Vitals [06/09/25 1759]   /87   Pulse 91   Resp 24   Temp 98.7 °F (37.1 °C)   Temp src Oral   SpO2 98 %   O2 Device None (Room air)       Current:/87   Pulse 91   Temp 98.7 °F (37.1 °C) (Oral)   Resp 24   LMP 05/18/2025 (Exact Date)   SpO2 98%       Physical Exam  Vitals and nursing note reviewed.   Constitutional:       General: She is not in acute distress.     Appearance: Normal appearance. She is obese. She is not ill-appearing or  toxic-appearing.   HENT:      Head: Normocephalic and atraumatic.      Right Ear: Ear canal and external ear normal. No drainage, swelling or tenderness. No middle ear effusion. Tympanic membrane is injected.      Left Ear: Ear canal and external ear normal. No drainage, swelling or tenderness.  No middle ear effusion. Tympanic membrane is injected.      Nose: Congestion and rhinorrhea present.      Mouth/Throat:      Mouth: Mucous membranes are moist.      Pharynx: Uvula midline. Posterior oropharyngeal erythema and postnasal drip present. No pharyngeal swelling, oropharyngeal exudate or uvula swelling.      Tonsils: No tonsillar exudate. 1+ on the right. 1+ on the left.   Eyes:      General:         Right eye: No discharge.         Left eye: No discharge.      Extraocular Movements: Extraocular movements intact.      Conjunctiva/sclera: Conjunctivae normal.      Pupils: Pupils are equal, round, and reactive to light.   Cardiovascular:      Rate and Rhythm: Normal rate and regular rhythm.      Pulses: Normal pulses.      Heart sounds: Normal heart sounds.   Pulmonary:      Effort: Pulmonary effort is normal.      Breath sounds: Normal breath sounds.   Musculoskeletal:      Cervical back: Normal range of motion and neck supple.   Skin:     General: Skin is warm and dry.      Coloration: Skin is not jaundiced or pale.   Neurological:      General: No focal deficit present.      Mental Status: She is alert and oriented to person, place, and time.   Psychiatric:         Mood and Affect: Mood normal.         Behavior: Behavior normal.            ED Course     Labs Reviewed   POCT FLU TEST - Normal    Narrative:     This assay is a rapid molecular in vitro test utilizing nucleic acid amplification of influenza A and B viral RNA.   RAPID SARS-COV-2 BY PCR - Normal     No orders to display       Vitals:    06/09/25 1759   BP: 140/87   Pulse: 91   Resp: 24   Temp: 98.7 °F (37.1 °C)   TempSrc: Oral   SpO2: 98%            MDM         Malka Crystal, 24 year old female with medical history as noted above who presents with cough, PND, sore throat and nasal congestion.   -VSS. NAD  -Differential diagnosis considered but not limited to COVID, influenza, other viral URI, strep pharyngitis   -negative flu and covid, posterior pharynx with mild erythema, no exudate, or tonsillar swelling   -Likely upper respiratory infection. Most upper respiratory infections are viral in nature and do not respond to antibiotic treatments. The best management is symptomatic care.  Please note not all OTC medications are safe for everyone. Look at combination cold medications to avoid double dosing.   -Advised on ibuprofen and acetaminophen for fevers, chills, pain, sore throat, body aches, and headaches  To reduce congestion use a cool mist humidifier or vaporizer, and breathe in steam from a warm shower.   Recommended Mucinex (guaifenesin) or Robitussin (dextromethorphan and guaifenesin) for congestion and cough. One teaspoon of honey three times a day for cough  Can use Vicks on chest at night  Prop yourself up with a few pillows at night to help you breathe comfortably during sleep.    Drink plenty of fluids to stay well hydrated, and get lots of rest. This helps loosen up mucus    Try a saline nasal spray OTC twice daily to facilitate mucus drainage and keep nares moist. Perform this step first before using any steroid/medicated nasal sprays such as flonase. Flonase is to be used OTC 1-2 sprays in each nostril once daily. Proper use of nasal sprays includes blowing your nose before administration of nasal spray. Flonase can dry out your nose.   Discussed use of pseudoephedrine and oxymetazoline (Afrin) for relief of nasal congestion. Do not use for more than 3 days or else it can cause rebound congestion. Do not use with history of hypertension  Can use OTC allergy medications such as claritin, zyrtec, or allegra to help dry out secretions.   Use of  elderberry, vitamin C, and Zinc for immune support  Educated patient on salt water gargles and use of cepacol for sore throat     The patient is encouraged to return if any concerning symptoms arise. Additional verbal discharge instructions are given and discussed. Discharge medications are discussed. The patient is in good condition throughout the visit today and remains so upon discharge. I discuss the plan of care with the patient, who expresses understanding. All questions and concerns are addressed to the patient's satisfaction prior to discharge today.       ** See ED course below for additional information on care provided / interventions / notable events throughout patient's encounter.         ** I have independently reviewed the radiology images, clinical lab results, and ECG tracings as described above (if applicable)    ** Concerning co-morbidities possibly affecting complaint / care: pre-DM        Medical Decision Making  Amount and/or Complexity of Data Reviewed  Labs: ordered. Decision-making details documented in ED Course.    Risk  OTC drugs.        Disposition and Plan     Disposition:  Discharge  6/9/2025  6:53 pm    Clinical Impression:  1. Cough    2. Viral upper respiratory tract infection         Follow-up:  Lea Green MD  [address] [place], [place] [id number] [phone]      As needed, If symptoms worsen        Medications Prescribed:  Discharge Medication List as of 6/9/2025  6:47 PM            CONNOR Amador, APRN, FNP-BC  Family Nurse Practitioner  [facility]      The above patient (and/or guardian) was made aware that an appropriate evaluation has been performed, and that no additional testing is required at this time. In my medical judgment, there is currently no evidence of an immediate life-threatening or surgical condition, therefore discharge is indicated at this time. The patient (and/or guardian) was advised that a small risk still exists that a  serious condition could develop. The patient was instructed to arrange close follow-up with their primary care provider (or the referral provider given today). The patient received written and verbal instructions regarding their condition / concerns, demonstrated understanding, and is agreement with the outpatient treatment plan.            [1]   Patient Active Problem List  Diagnosis    Allergic rhinitis    Family history of asthma in mother    Encounter for therapeutic drug monitoring    Morbid obesity with BMI of 40.0-44.9, adult (HCC)    Prediabetes    Fatty liver    Vitamin D deficiency    Abnormal uterine bleeding (AUB)    Secondary amenorrhea    PCOS (polycystic ovarian syndrome)    Severe episode of recurrent major depressive disorder, without psychotic features (HCC)    Generalized anxiety disorder    Suicide attempt (HCC)    Dyslipidemia    Insulin resistance   [2]   No current facility-administered medications on file prior to encounter.     Current Outpatient Medications on File Prior to Encounter   Medication Sig Dispense Refill    metFORMIN  MG Oral Tablet 24 Hr Take 1 tablet (500 mg total) by mouth daily. 90 tablet 2    Phentermine HCl 15 MG Oral Cap Take 1 capsule (15 mg total) by mouth every morning. 30 capsule 2    sertraline 100 MG Oral Tab Take 1 tablet (100 mg total) by mouth nightly. 30 tablet 0    sertraline 50 MG Oral Tab TAKE 1 TABLET BY MOUTH DAILY AT BEDTIME WITH 100 MG TABLET 30 tablet 0

## 2025-06-13 ENCOUNTER — HOSPITAL ENCOUNTER (OUTPATIENT)
Age: 25
Discharge: HOME | End: 2025-06-13
Payer: COMMERCIAL

## 2025-06-13 VITALS
HEART RATE: 92 BPM | OXYGEN SATURATION: 98 % | SYSTOLIC BLOOD PRESSURE: 126 MMHG | WEIGHT: 230 LBS | RESPIRATION RATE: 20 BRPM | BODY MASS INDEX: 42.33 KG/M2 | TEMPERATURE: 98 F | DIASTOLIC BLOOD PRESSURE: 77 MMHG | HEIGHT: 62 IN

## 2025-06-13 DIAGNOSIS — J98.01 BRONCHOSPASM: Primary | ICD-10-CM

## 2025-06-13 PROCEDURE — 99214 OFFICE O/P EST MOD 30 MIN: CPT

## 2025-06-13 PROCEDURE — 94640 AIRWAY INHALATION TREATMENT: CPT

## 2025-06-13 RX ORDER — IPRATROPIUM BROMIDE AND ALBUTEROL SULFATE 2.5; .5 MG/3ML; MG/3ML
3 SOLUTION RESPIRATORY (INHALATION)
Qty: 30 EACH | Refills: 0 | Status: SHIPPED | OUTPATIENT
Start: 2025-06-13

## 2025-06-13 RX ORDER — ALBUTEROL SULFATE 90 UG/1
2 INHALANT RESPIRATORY (INHALATION) EVERY 4 HOURS PRN
Qty: 1 EACH | Refills: 0 | Status: SHIPPED | OUTPATIENT
Start: 2025-06-13 | End: 2025-07-13

## 2025-06-13 RX ORDER — IPRATROPIUM BROMIDE AND ALBUTEROL SULFATE 2.5; .5 MG/3ML; MG/3ML
3 SOLUTION RESPIRATORY (INHALATION) ONCE
Status: COMPLETED | OUTPATIENT
Start: 2025-06-13 | End: 2025-06-13

## 2025-06-13 RX ORDER — PROMETHAZINE HYDROCHLORIDE 25 MG/1
3 TABLET ORAL
Qty: 30 EACH | Refills: 0 | Status: SHIPPED | OUTPATIENT
Start: 2025-06-13

## 2025-06-13 RX ORDER — PREDNISONE 20 MG/1
40 TABLET ORAL DAILY
Qty: 10 TABLET | Refills: 0 | Status: SHIPPED | OUTPATIENT
Start: 2025-06-13 | End: 2025-06-18

## 2025-06-13 NOTE — ED INITIAL ASSESSMENT (HPI)
Cough - started Saturday with cough with phegm, nasal congestion , post nasal drip.   Pt went ti an Immed care Monday   no  rx pt was advised to take robitussin , advil

## 2025-06-13 NOTE — ED PROVIDER NOTES
History     Chief Complaint   Patient presents with    Cough       Subjective:   HPI    Malka Crystal, 24 year old female with notable medical history of allergic rhinitis, obesity, low vitamin D who presents with cough. PATIENT was seen in IC on 6/9/25 for similar but reports persistent symptoms despite taking Robitussin. Denies fever, chills, SOB, ASTUDILLO. Tolerating PO well. She attempted to use an old Albuterol inhaler which did temporarily improve symptoms.      Problem List[1]   Objective:   Past Medical History:    Depression    History of chicken pox    PCOS (polycystic ovarian syndrome)              History reviewed. No pertinent surgical history.             Social History     Socioeconomic History    Marital status: Single   Tobacco Use    Smoking status: Never    Smokeless tobacco: Never    Tobacco comments:     No passive smoke exposure   Vaping Use    Vaping status: Never Used   Substance and Sexual Activity    Alcohol use: Yes     Comment: Social    Drug use: Yes     Frequency: 1.0 times per week     Types: Cannabis    Sexual activity: Yes     Partners: Male     Birth control/protection: OCP   Other Topics Concern    Caffeine Concern No    Exercise No    Seat Belt Yes              Medications Ordered Prior to Encounter[2]      Constitutional and vital signs reviewed.      All other systems reviewed and negative except as noted above.    I have reviewed the family history, social history, allergies, and outpatient medications.     History reviewed from EMR: Encounters, problem list, allergies, medications      Physical Exam     ED Triage Vitals [06/13/25 1518]   /77   Pulse 92   Resp 20   Temp 98.2 °F (36.8 °C)   Temp src Oral   SpO2 98 %   O2 Device None (Room air)       Current:/77   Pulse 92   Temp 98.2 °F (36.8 °C) (Oral)   Resp 20   Ht 157.5 cm (5' 2\")   Wt 104.3 kg   LMP 05/18/2025 (Exact Date)   SpO2 98%   BMI 42.07 kg/m²       Physical Exam  Vitals and nursing note  reviewed.   Constitutional:       General: She is not in acute distress.     Appearance: Normal appearance. She is obese. She is not ill-appearing or toxic-appearing.   HENT:      Head: Normocephalic and atraumatic.      Right Ear: External ear normal.      Left Ear: External ear normal.      Nose: Nose normal.      Mouth/Throat:      Mouth: Mucous membranes are moist.   Eyes:      Extraocular Movements: Extraocular movements intact.      Conjunctiva/sclera: Conjunctivae normal.      Pupils: Pupils are equal, round, and reactive to light.   Cardiovascular:      Rate and Rhythm: Normal rate and regular rhythm.      Pulses: Normal pulses.   Pulmonary:      Effort: Pulmonary effort is normal. No respiratory distress.      Comments: No distress. +mild mucosal sounds. +bronchospasm.  Musculoskeletal:         General: No swelling, tenderness or signs of injury. Normal range of motion.      Cervical back: Normal range of motion and neck supple.   Skin:     General: Skin is warm and dry.      Capillary Refill: Capillary refill takes less than 2 seconds.      Coloration: Skin is not jaundiced.   Neurological:      General: No focal deficit present.      Mental Status: She is alert and oriented to person, place, and time. Mental status is at baseline.   Psychiatric:         Mood and Affect: Mood normal.         Behavior: Behavior normal.         Thought Content: Thought content normal.         Judgment: Judgment normal.            ED Course     Labs Reviewed - No data to display  No orders to display       Vitals:    06/13/25 1518   BP: 126/77   Pulse: 92   Resp: 20   Temp: 98.2 °F (36.8 °C)   TempSrc: Oral   SpO2: 98%   Weight: 104.3 kg   Height: 157.5 cm (5' 2\")            [facility]        Malka Crystal, 24 year old female with medical history as noted above who presents with cough   - Patient in NAD, VSS   - Viral URI w/ cough vs PNA vs other   - Cardiopulmonary exam less suspicious for PNA   - Duo neb ordered          ** See  ED course below for additional information on care provided / interventions / notable events throughout patient's encounter.      ED Course as of 06/13/25 1601  ------------------------------------------------------------  Time: 06/13 1600  Comment: LS much improved s/p neb with patient reporting feeling better  Supportive care discussed  RTED/IC precautions discussed  Follow up with primary care provider as needed        ** I have independently reviewed the radiology images, clinical lab results, and ECG tracings as described above (if applicable)    ** Concerning co-morbidities possibly affecting complaint / care: obesity        Medical Decision Making  Risk  OTC drugs.  Prescription drug management.        Disposition and Plan     Disposition:  Discharge  6/13/2025  4:00 pm    Clinical Impression:  1. Bronchospasm            Home care instructions:       - Use inhaler (1-2 puffs every 4-6 hours) with spacer as needed for chest tightness, wheezing, and/or bronchospasm   - Use duo-neb as needed for chest tightness / wheezing   - Use saline for sinus inflammation (can also add to duo-neb solution)    Supportive care measures to try as applicable:  Infection Control:   - Wash hands often, change toothbrush, & disinfect \"high-touch\" items to limit viral spread   - Do not share utensils or drinks    Sore throat:   - Salt water gargles & Lozenges (Cepacol or Ricola)    Sinus:   - Using saline spray or a couple drops into nostrils a couple times a day can help with sinus inflammation & move mucus   - Avoid having air blow on your face as this can worsen congestion   - You may benefit from placing a garlic clove in each nostril for 10-15min which should irritate sinuses, causing you to get rid of stuck mucus. Blow nose afterwards.   - You may benefit from taking a decongestant (e.g. Sudafed - pseudoephedrine [behind the pharmacy counter]) (may temporarily elevate your heart rate and blood pressure)   - You may benefit  from using a humidifier and/or steam showers then blow nose   - You may benefit from Flonase nasal spray daily (use with head tilted down and tip pointed towards outside of eye. Breath normally. You should not feel medication go down your throat)   - You may benefit from taking a daily allergy medication (e.g. Zyrtec, Xyzal, etc.)   - You may benefit from boiling water with lemon and cayenne pepper, then breathing in the steam (you can cover your head with a towel to help funnel the steam)    Cough:   - You may benefit from spoonfuls of honey (or added to warm drinks) throughout the day   - You may benefit from cough medication containing \"Dextromethorphan [DM]\" (e.g. Delsym)   - Sleeping in an upright position      Follow-up:  Lea Green MD  [address] [place] [id number] [phone]      As needed          Medications Prescribed:  Current Discharge Medication List        START taking these medications    Details   ipratropium-albuterol 0.5-2.5 (3) MG/3ML Inhalation Solution Take 3 mL by nebulization every 4 to 6 hours as needed (wheezing, bronchospasm).  Qty: 30 each, Refills: 0      predniSONE 20 MG Oral Tab Take 2 tablets (40 mg total) by mouth daily for 5 days.  Qty: 10 tablet, Refills: 0      sodium chloride 0.9 % Inhalation Nebu Soln Take 3 mL by nebulization every 4 to 6 hours as needed for Wheezing (cough).  Qty: 30 each, Refills: 0    Comments: OK to substitute quantity based on availability      albuterol 108 (90 Base) MCG/ACT Inhalation Aero Soln Inhale 2 puffs into the lungs every 4 (four) hours as needed for Wheezing.  Qty: 1 each, Refills: 0               Jose Carlos Regalado, DNP, APRN, AGACNP-BC, FNP-C, CNL  Adult-Gerontology Acute Care & Family Nurse Practitioner  [facility]      The above patient (and/or guardian) was made aware that an appropriate evaluation has been performed, and that no additional testing is required at this time. In my medical judgment, there  is currently no evidence of an immediate life-threatening or surgical condition, therefore discharge is indicated at this time. The patient (and/or guardian) was advised that a small risk still exists that a serious condition could develop. The patient was instructed to arrange close follow-up with their primary care provider (or the referral provider given today). The patient received written and verbal instructions regarding their condition / concerns, demonstrated understanding, and is agreement with the outpatient treatment plan.              [1]   Patient Active Problem List  Diagnosis    Allergic rhinitis    Family history of asthma in mother    Encounter for therapeutic drug monitoring    Morbid obesity with BMI of 40.0-44.9, adult (HCC)    Prediabetes    Fatty liver    Vitamin D deficiency    Abnormal uterine bleeding (AUB)    Secondary amenorrhea    PCOS (polycystic ovarian syndrome)    Severe episode of recurrent major depressive disorder, without psychotic features (HCC)    Generalized anxiety disorder    Suicide attempt (HCC)    Dyslipidemia    Insulin resistance   [2]   No current facility-administered medications on file prior to encounter.     Current Outpatient Medications on File Prior to Encounter   Medication Sig Dispense Refill    metFORMIN  MG Oral Tablet 24 Hr Take 1 tablet (500 mg total) by mouth daily. 90 tablet 2    Phentermine HCl 15 MG Oral Cap Take 1 capsule (15 mg total) by mouth every morning. 30 capsule 2    sertraline 100 MG Oral Tab Take 1 tablet (100 mg total) by mouth nightly. 30 tablet 0    sertraline 50 MG Oral Tab TAKE 1 TABLET BY MOUTH DAILY AT BEDTIME WITH 100 MG TABLET 30 tablet 0

## 2025-06-13 NOTE — DISCHARGE INSTRUCTIONS
- Use inhaler (1-2 puffs every 4-6 hours) with spacer as needed for chest tightness, wheezing, and/or bronchospasm   - Use duo-neb as needed for chest tightness / wheezing   - Use saline for sinus inflammation (can also add to duo-neb solution)    Supportive care measures to try as applicable:  Infection Control:   - Wash hands often, change toothbrush, & disinfect \"high-touch\" items to limit viral spread   - Do not share utensils or drinks    Sore throat:   - Salt water gargles & Lozenges (Cepacol or Ricola)    Sinus:   - Using saline spray or a couple drops into nostrils a couple times a day can help with sinus inflammation & move mucus   - Avoid having air blow on your face as this can worsen congestion   - You may benefit from placing a garlic clove in each nostril for 10-15min which should irritate sinuses, causing you to get rid of stuck mucus. Blow nose afterwards.   - You may benefit from taking a decongestant (e.g. Sudafed - pseudoephedrine [behind the pharmacy counter]) (may temporarily elevate your heart rate and blood pressure)   - You may benefit from using a humidifier and/or steam showers then blow nose   - You may benefit from Flonase nasal spray daily (use with head tilted down and tip pointed towards outside of eye. Breath normally. You should not feel medication go down your throat)   - You may benefit from taking a daily allergy medication (e.g. Zyrtec, Xyzal, etc.)   - You may benefit from boiling water with lemon and cayenne pepper, then breathing in the steam (you can cover your head with a towel to help funnel the steam)    Cough:   - You may benefit from spoonfuls of honey (or added to warm drinks) throughout the day   - You may benefit from cough medication containing \"Dextromethorphan [DM]\" (e.g. Delsym)   - Sleeping in an upright position

## 2025-06-18 ENCOUNTER — TELEMEDICINE (OUTPATIENT)
Dept: INTERNAL MEDICINE CLINIC | Facility: CLINIC | Age: 25
End: 2025-06-18
Payer: COMMERCIAL

## 2025-06-18 DIAGNOSIS — R73.03 PREDIABETES: ICD-10-CM

## 2025-06-18 DIAGNOSIS — E88.819 INSULIN RESISTANCE: ICD-10-CM

## 2025-06-18 DIAGNOSIS — K76.0 FATTY LIVER: Primary | ICD-10-CM

## 2025-06-18 DIAGNOSIS — E55.9 VITAMIN D DEFICIENCY: ICD-10-CM

## 2025-06-18 DIAGNOSIS — E78.5 DYSLIPIDEMIA: ICD-10-CM

## 2025-06-18 DIAGNOSIS — Z51.81 ENCOUNTER FOR THERAPEUTIC DRUG MONITORING: ICD-10-CM

## 2025-06-18 DIAGNOSIS — E28.2 PCOS (POLYCYSTIC OVARIAN SYNDROME): ICD-10-CM

## 2025-06-18 PROCEDURE — 98005 SYNCH AUDIO-VIDEO EST LOW 20: CPT | Performed by: NURSE PRACTITIONER
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
## (undated) NOTE — LETTER
Date & Time: 7/20/2021, 8:01 PM  Patient: Gabe Orozco  Encounter Provider(s):    MD Esther Fallon PA-C       To Whom It May Concern:    Gabe Orozco was seen and treated in our department on 7/20/2021.  She should not return to wo

## (undated) NOTE — LETTER
04/30/21        Margret Carlos  [address]      Dear [facility],    [address] [facility] records indicate that you have outstanding lab work and or testing that was ordered for you and has not yet been completed:  Orders Placed This Encounte

## (undated) NOTE — LETTER
Date & Time: 6/15/2022, 11:48 AM  Patient: Analy Armstrong  Encounter Provider(s):    Tonette Lundborg, PA       To Whom It May Concern:    Analy Armstrong was seen and treated in our department on 6/15/2022. Patient is positive for COVID. Per CDC guidelines 5 days of quarantine from symptoms and 5 days of heavy masking.     If you have any questions or concerns, please do not hesitate to call.        _____________________________  Physician/APC Signature

## (undated) NOTE — LETTER
Date: 9/28/2019    Patient Name: Tejas Mares          To Whom it may concern: This letter has been written at the patient's request. The above patient was seen at the [facility] for treatment of a medical condition.     This patient shou

## (undated) NOTE — LETTER
Date & Time: 11/30/2023, 12:50 PM  Patient: Hernan Pereira  Encounter Provider(s):    Denise Oquendo., MAC       To Whom It May Concern:    Hernan Pereira was seen and treated in our department on 11/30/2023. She should not return to work until 12/04/23 .     If you have any questions or concerns, please do not hesitate to call.        _____________________________  Physician/APC Signature

## (undated) NOTE — LETTER
Date & Time: 7/3/2024, 9:15 AM  Patient: Malka Crystal  Encounter Provider(s):    Radha Levy APRN       To Whom It May Concern:    Malka Crystal was seen and treated in our department on 7/3/2024. She can return to work 07/07/2024.    If you have any questions or concerns, please do not hesitate to call.        _____________________________  Physician/APC Signature

## (undated) NOTE — LETTER
Date & Time: 6/9/2025, 6:43 PM  Patient: Malka Crystal  Encounter Provider(s):    Marjorie Nails APRN       To Whom It May Concern:    Malka Crystal was seen and treated in our department on 6/9/2025. She should not return to work until 6/11/25.    If you have any questions or concerns, please do not hesitate to call.        _____________________________  Physician/APC Signature

## (undated) NOTE — LETTER
Date & Time: 10/20/2022, 12:57 PM  Patient: Dilan Bernabe  Encounter Provider(s):    Scarlett Diego PA-C       To Whom It May Concern:    Dilan Bernabe was seen and treated in our department on 10/20/2022. She should not return to work until 10/22/22.     If you have any questions or concerns, please do not hesitate to call.        _____________________________  Physician/APC Signature

## (undated) NOTE — Clinical Note
Advised on ibuprofen and acetaminophen for fevers, chills, pain, sore throat, body aches, and headaches  To reduce congestion use a cool mist humidifier or vaporizer, and breathe in steam from a warm shower.   Recommended Mucinex (guaifenesin) or Jah itussin (dextromethorphan and guaifenesin) for congestion and cough. One teaspoon of honey three times a day for cough  Can use Vicks on chest at night  Prop yourself up with a few pillows at night to help you breathe comfortably during sleep.    Dri nk plenty of fluids to stay well hydrated, and get lots of rest. This helps loosen up mucus    Try a saline nasal spray OTC twice daily to facilitate mucus drainage and keep nares moist. Perform this step first before using any steroid/medicated nasa l sprays such as flonase. Flonase is to be used OTC 1-2 sprays in each nostril once daily. Proper use of nasal sprays includes blowing your nose before administration of nasal spray. Flonase can dry out your nose.   Discussed use of pseudoephedrine a nd oxymetazoline (Afrin) for relief of nasal congestion. Do not use for more than 3 days or else it can cause rebound congestion. Do not use with history of hypertension  Can use OTC allergy medications such as claritin, zyrtec, or allegra to help dr y out secretions.   Use of elderberry, vitamin C, and Zinc for immune support  Educated patient on salt water gargles and use of cepacol for sore throat